# Patient Record
Sex: MALE | Race: WHITE | Employment: FULL TIME | URBAN - NONMETROPOLITAN AREA
[De-identification: names, ages, dates, MRNs, and addresses within clinical notes are randomized per-mention and may not be internally consistent; named-entity substitution may affect disease eponyms.]

---

## 2017-03-06 LAB
ABSOLUTE EOS #: 0.3 K/UL (ref 0–0.4)
ABSOLUTE LYMPH #: 1.8 K/UL (ref 1–4.8)
ABSOLUTE MONO #: 0.8 K/UL (ref 0.1–1.2)
ANION GAP SERPL CALCULATED.3IONS-SCNC: 12 MMOL/L (ref 9–17)
BASOPHILS # BLD: 1 % (ref 0–2)
BASOPHILS ABSOLUTE: 0.1 K/UL (ref 0–0.2)
BUN BLDV-MCNC: 12 MG/DL (ref 6–20)
BUN/CREAT BLD: 15 (ref 9–20)
CALCIUM SERPL-MCNC: 9.3 MG/DL (ref 8.6–10.4)
CHLORIDE BLD-SCNC: 101 MMOL/L (ref 98–107)
CHOLESTEROL/HDL RATIO: 3.7
CHOLESTEROL: 132 MG/DL
CO2: 27 MMOL/L (ref 20–31)
CREAT SERPL-MCNC: 0.8 MG/DL (ref 0.7–1.2)
DIFFERENTIAL TYPE: ABNORMAL
EOSINOPHILS RELATIVE PERCENT: 4 % (ref 1–4)
GFR AFRICAN AMERICAN: >60 ML/MIN
GFR NON-AFRICAN AMERICAN: >60 ML/MIN
GFR SERPL CREATININE-BSD FRML MDRD: NORMAL ML/MIN/{1.73_M2}
GFR SERPL CREATININE-BSD FRML MDRD: NORMAL ML/MIN/{1.73_M2}
GLUCOSE BLD-MCNC: 98 MG/DL (ref 70–99)
HCT VFR BLD CALC: 44 % (ref 41–53)
HDLC SERPL-MCNC: 36 MG/DL
HEMOGLOBIN: 14.7 G/DL (ref 13.5–17.5)
LDL CHOLESTEROL: 69 MG/DL (ref 0–130)
LYMPHOCYTES # BLD: 26 % (ref 24–44)
MCH RBC QN AUTO: 30.4 PG (ref 26–34)
MCHC RBC AUTO-ENTMCNC: 33.3 G/DL (ref 31–37)
MCV RBC AUTO: 91.2 FL (ref 80–100)
MONOCYTES # BLD: 12 % (ref 1–7)
PDW BLD-RTO: 12.9 % (ref 11–14.5)
PLATELET # BLD: 288 K/UL (ref 140–450)
PLATELET ESTIMATE: ABNORMAL
PMV BLD AUTO: 7.5 FL (ref 6–12)
POTASSIUM SERPL-SCNC: 4.3 MMOL/L (ref 3.7–5.3)
RBC # BLD: 4.82 M/UL (ref 4.5–5.9)
RBC # BLD: ABNORMAL 10*6/UL
SEG NEUTROPHILS: 57 % (ref 36–66)
SEGMENTED NEUTROPHILS ABSOLUTE COUNT: 4.1 K/UL (ref 1.8–7.7)
SEX HORMONE BINDING GLOBULIN: 23 NMOL/L (ref 11–80)
SODIUM BLD-SCNC: 140 MMOL/L (ref 135–144)
TESTOSTERONE FREE-NONMALE: 56 PG/ML (ref 47–244)
TESTOSTERONE TOTAL: 239 NG/DL (ref 220–1000)
TRIGL SERPL-MCNC: 135 MG/DL
TSH SERPL DL<=0.05 MIU/L-ACNC: 1.23 MIU/L (ref 0.3–5)
VLDLC SERPL CALC-MCNC: 27 MG/DL (ref 1–30)
WBC # BLD: 7.1 K/UL (ref 3.5–11)
WBC # BLD: ABNORMAL 10*3/UL

## 2017-12-07 ENCOUNTER — HOSPITAL ENCOUNTER (OUTPATIENT)
Dept: CT IMAGING | Age: 42
Discharge: HOME | End: 2017-12-07
Payer: COMMERCIAL

## 2017-12-07 DIAGNOSIS — M53.3 COCCYX PAIN: ICD-10-CM

## 2017-12-07 PROCEDURE — 72192 CT PELVIS W/O DYE: CPT

## 2018-10-04 ENCOUNTER — HOSPITAL ENCOUNTER (OUTPATIENT)
Dept: LAB | Age: 43
Discharge: HOME | End: 2018-10-04
Payer: COMMERCIAL

## 2018-10-04 ENCOUNTER — OFFICE VISIT (OUTPATIENT)
Dept: FAMILY MEDICINE CLINIC | Age: 43
End: 2018-10-04
Payer: COMMERCIAL

## 2018-10-04 VITALS
BODY MASS INDEX: 25.77 KG/M2 | TEMPERATURE: 98.2 F | DIASTOLIC BLOOD PRESSURE: 86 MMHG | OXYGEN SATURATION: 97 % | HEIGHT: 74 IN | HEART RATE: 83 BPM | SYSTOLIC BLOOD PRESSURE: 138 MMHG | WEIGHT: 200.8 LBS

## 2018-10-04 DIAGNOSIS — E55.9 VITAMIN D DEFICIENCY: ICD-10-CM

## 2018-10-04 DIAGNOSIS — Z11.4 SCREENING FOR HIV (HUMAN IMMUNODEFICIENCY VIRUS): ICD-10-CM

## 2018-10-04 DIAGNOSIS — Z13.1 SCREENING FOR DIABETES MELLITUS: ICD-10-CM

## 2018-10-04 DIAGNOSIS — Z13.220 SCREENING CHOLESTEROL LEVEL: ICD-10-CM

## 2018-10-04 DIAGNOSIS — R53.82 CHRONIC FATIGUE: ICD-10-CM

## 2018-10-04 DIAGNOSIS — Z00.00 VISIT FOR WELL MAN HEALTH CHECK: Primary | ICD-10-CM

## 2018-10-04 PROBLEM — R53.83 FATIGUE: Status: ACTIVE | Noted: 2017-03-17

## 2018-10-04 PROBLEM — G47.9 SLEEP DISTURBANCE: Status: ACTIVE | Noted: 2017-03-17

## 2018-10-04 LAB
ABSOLUTE EOS #: 0.2 K/UL (ref 0–0.4)
ABSOLUTE IMMATURE GRANULOCYTE: NORMAL K/UL (ref 0–0.3)
ABSOLUTE LYMPH #: 2.5 K/UL (ref 1–4.8)
ABSOLUTE MONO #: 0.6 K/UL (ref 0.1–1.2)
ANION GAP SERPL CALCULATED.3IONS-SCNC: 11 MMOL/L (ref 9–17)
BASOPHILS # BLD: 2 % (ref 0–2)
BASOPHILS ABSOLUTE: 0.1 K/UL (ref 0–0.2)
BUN BLDV-MCNC: 14 MG/DL (ref 6–20)
BUN/CREAT BLD: 16 (ref 9–20)
CALCIUM SERPL-MCNC: 9.5 MG/DL (ref 8.6–10.4)
CHLORIDE BLD-SCNC: 103 MMOL/L (ref 98–107)
CHOLESTEROL/HDL RATIO: 4.2
CHOLESTEROL: 161 MG/DL
CO2: 26 MMOL/L (ref 20–31)
CREAT SERPL-MCNC: 0.88 MG/DL (ref 0.7–1.2)
DIFFERENTIAL TYPE: NORMAL
EOSINOPHILS RELATIVE PERCENT: 3 % (ref 1–8)
ESTIMATED AVERAGE GLUCOSE: 105 MG/DL
GFR AFRICAN AMERICAN: >60 ML/MIN
GFR NON-AFRICAN AMERICAN: >60 ML/MIN
GFR SERPL CREATININE-BSD FRML MDRD: ABNORMAL ML/MIN/{1.73_M2}
GFR SERPL CREATININE-BSD FRML MDRD: ABNORMAL ML/MIN/{1.73_M2}
GLUCOSE BLD-MCNC: 107 MG/DL (ref 70–99)
HBA1C MFR BLD: 5.3 % (ref 4.8–5.9)
HCT VFR BLD CALC: 42.9 % (ref 41–53)
HDLC SERPL-MCNC: 38 MG/DL
HEMOGLOBIN: 14.6 G/DL (ref 13.5–17.5)
IMMATURE GRANULOCYTES: NORMAL %
LDL CHOLESTEROL: 83 MG/DL (ref 0–130)
LYMPHOCYTES # BLD: 34 % (ref 15–43)
MCH RBC QN AUTO: 32.3 PG (ref 26–34)
MCHC RBC AUTO-ENTMCNC: 34.1 G/DL (ref 31–37)
MCV RBC AUTO: 94.8 FL (ref 80–100)
MONOCYTES # BLD: 9 % (ref 6–14)
NRBC AUTOMATED: NORMAL PER 100 WBC
PDW BLD-RTO: 12.5 % (ref 11–14.5)
PLATELET # BLD: 299 K/UL (ref 140–450)
PLATELET ESTIMATE: NORMAL
PMV BLD AUTO: 8.2 FL (ref 6–12)
POTASSIUM SERPL-SCNC: 4.1 MMOL/L (ref 3.7–5.3)
RBC # BLD: 4.52 M/UL (ref 4.5–5.9)
RBC # BLD: NORMAL 10*6/UL
SEG NEUTROPHILS: 52 % (ref 44–74)
SEGMENTED NEUTROPHILS ABSOLUTE COUNT: 3.9 K/UL (ref 1.8–7.7)
SODIUM BLD-SCNC: 140 MMOL/L (ref 135–144)
TRIGL SERPL-MCNC: 202 MG/DL
TSH SERPL DL<=0.05 MIU/L-ACNC: 1.03 MIU/L (ref 0.3–5)
VLDLC SERPL CALC-MCNC: ABNORMAL MG/DL (ref 1–30)
WBC # BLD: 7.4 K/UL (ref 3.5–11)
WBC # BLD: NORMAL 10*3/UL

## 2018-10-04 PROCEDURE — 80061 LIPID PANEL: CPT

## 2018-10-04 PROCEDURE — 36415 COLL VENOUS BLD VENIPUNCTURE: CPT

## 2018-10-04 PROCEDURE — 84443 ASSAY THYROID STIM HORMONE: CPT

## 2018-10-04 PROCEDURE — 84270 ASSAY OF SEX HORMONE GLOBUL: CPT

## 2018-10-04 PROCEDURE — 80048 BASIC METABOLIC PNL TOTAL CA: CPT

## 2018-10-04 PROCEDURE — 85025 COMPLETE CBC W/AUTO DIFF WBC: CPT

## 2018-10-04 PROCEDURE — 83036 HEMOGLOBIN GLYCOSYLATED A1C: CPT

## 2018-10-04 PROCEDURE — 82306 VITAMIN D 25 HYDROXY: CPT

## 2018-10-04 PROCEDURE — 84403 ASSAY OF TOTAL TESTOSTERONE: CPT

## 2018-10-04 PROCEDURE — 99396 PREV VISIT EST AGE 40-64: CPT | Performed by: FAMILY MEDICINE

## 2018-10-04 PROCEDURE — 87389 HIV-1 AG W/HIV-1&-2 AB AG IA: CPT

## 2018-10-04 PROCEDURE — G8484 FLU IMMUNIZE NO ADMIN: HCPCS | Performed by: FAMILY MEDICINE

## 2018-10-04 RX ORDER — FLUTICASONE PROPIONATE 50 MCG
1 SPRAY, SUSPENSION (ML) NASAL DAILY
COMMUNITY
End: 2019-03-27

## 2018-10-04 RX ORDER — CALCIUM CARBONATE 200(500)MG
1 TABLET,CHEWABLE ORAL DAILY
COMMUNITY

## 2018-10-04 ASSESSMENT — ENCOUNTER SYMPTOMS
ABDOMINAL PAIN: 0
CHEST TIGHTNESS: 0
SHORTNESS OF BREATH: 0
CONSTIPATION: 0
DIARRHEA: 0
COUGH: 0
NAUSEA: 0
WHEEZING: 0

## 2018-10-04 ASSESSMENT — PATIENT HEALTH QUESTIONNAIRE - PHQ9
SUM OF ALL RESPONSES TO PHQ QUESTIONS 1-9: 0
1. LITTLE INTEREST OR PLEASURE IN DOING THINGS: 0
2. FEELING DOWN, DEPRESSED OR HOPELESS: 0
SUM OF ALL RESPONSES TO PHQ QUESTIONS 1-9: 0
SUM OF ALL RESPONSES TO PHQ9 QUESTIONS 1 & 2: 0

## 2018-10-04 NOTE — PROGRESS NOTES
[address] [facility]  Dept: [phone]  Dept Fax: [phone]  Loc: [phone]    Yana Hudson is a 44 y.o. male who presents today for his medical conditions/complaints as noted below. Yana Hudson is c/o of   Chief Complaint   Patient presents with   Vanesa Verdugo Doctor    Fatigue     x 1 year       HPI:     HPI Here today to establish care. He was previously seeing Dr. Maliha Hernandez. He is due for his well visit and he has been very tired. Fatigue: worsening; he has been having issues with feeling very tired and exhausted for the past 2 years. He had this evaluated by his previous pcp with blood work and they didn't seem to find something. He tried taking vit D and it didn't seem to help. He does not sleep well because he has a 1year old in his bed. He does not nap during the day. He gets up at 5am and goes to bed around 10:30pm. He is not having any issues with feeling depressed. He feels like his muscles get really fatigued easily. 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 He has issues with hip pain and his hips feeling out of place. Diet: He eats a balanced diet. He eats most foods. He is trying to eat healthier breakfast. He has been trying to drink less pop. Exercise: he does not get any regular exercise because he is frequently tired after work. He takes his omeprazole prn when he has stomach pain. When he was taking omeprazole he ended up with some broken bones.        Past Medical History:   Diagnosis Date    Allergic rhinitis     Dermatitis     scalp    Headache     Osteoarthritis     stomach ulcer           Social History   Substance Use Topics    Smoking status: Never Smoker    Smokeless tobacco: Never Used    Alcohol use No      Current Outpatient Prescriptions   Medication Sig Dispense Refill    calcium carbonate (TUMS) 500 MG chewable tablet Take 1 tablet by mouth daily      fluticasone

## 2018-10-05 LAB
HIV AG/AB: NONREACTIVE
SEX HORMONE BINDING GLOBULIN: 22 NMOL/L (ref 11–80)
TESTOSTERONE FREE-NONMALE: 43.2 PG/ML (ref 47–244)
TESTOSTERONE TOTAL: 184 NG/DL (ref 220–1000)
VITAMIN D 25-HYDROXY: 28.2 NG/ML (ref 30–100)

## 2018-10-08 DIAGNOSIS — R89.9 ABNORMAL LABORATORY TEST RESULT: Primary | ICD-10-CM

## 2018-10-26 ENCOUNTER — E-VISIT (OUTPATIENT)
Dept: FAMILY MEDICINE CLINIC | Age: 43
End: 2018-10-26
Payer: COMMERCIAL

## 2018-10-26 DIAGNOSIS — J01.90 ACUTE BACTERIAL SINUSITIS: Primary | ICD-10-CM

## 2018-10-26 DIAGNOSIS — J40 BRONCHITIS: ICD-10-CM

## 2018-10-26 DIAGNOSIS — B96.89 ACUTE BACTERIAL SINUSITIS: Primary | ICD-10-CM

## 2018-10-26 PROCEDURE — 99444 PR PHYSICIAN ONLINE EVALUATION & MANAGEMENT SERVICE: CPT | Performed by: FAMILY MEDICINE

## 2018-10-26 RX ORDER — ALBUTEROL SULFATE 90 UG/1
2 AEROSOL, METERED RESPIRATORY (INHALATION) EVERY 4 HOURS PRN
Qty: 1 INHALER | Refills: 0 | Status: SHIPPED | OUTPATIENT
Start: 2018-10-26 | End: 2019-03-28

## 2018-10-26 RX ORDER — DOXYCYCLINE HYCLATE 100 MG/1
100 CAPSULE ORAL 2 TIMES DAILY
Qty: 20 CAPSULE | Refills: 0 | Status: SHIPPED | OUTPATIENT
Start: 2018-10-26 | End: 2018-11-05

## 2018-10-26 ASSESSMENT — LIFESTYLE VARIABLES: SMOKING_STATUS: NO

## 2018-11-05 ENCOUNTER — OFFICE VISIT (OUTPATIENT)
Dept: UROLOGY | Age: 43
End: 2018-11-05
Payer: COMMERCIAL

## 2018-11-05 VITALS
SYSTOLIC BLOOD PRESSURE: 118 MMHG | DIASTOLIC BLOOD PRESSURE: 86 MMHG | WEIGHT: 201.6 LBS | HEIGHT: 74 IN | OXYGEN SATURATION: 99 % | HEART RATE: 77 BPM | BODY MASS INDEX: 25.87 KG/M2

## 2018-11-05 DIAGNOSIS — E29.1 HYPOGONADISM IN MALE: Primary | ICD-10-CM

## 2018-11-05 DIAGNOSIS — N52.9 ERECTILE DYSFUNCTION, UNSPECIFIED ERECTILE DYSFUNCTION TYPE: ICD-10-CM

## 2018-11-05 PROCEDURE — 99205 OFFICE O/P NEW HI 60 MIN: CPT | Performed by: UROLOGY

## 2018-11-05 NOTE — PROGRESS NOTES
Juliocesar Collazo MD        [facility]  [address] [facility]  Dept: [phone]  Dept Fax: [phone]: [phone]      [facility] Office Note - New Patient    Patient:  Salome Cr  YOB: 1975  Date: 11/6/2018    The patient is a 44 y.o. male who presents today for evaluation of the following problems:   Chief Complaint   Patient presents with    Other     low testosterone    referred/consultation requested by Megan Dumont MD.    HISTORY OF PRESENT ILLNESS:     Onset was  Months ago  Overall, the problem(s) are worse. Severity is described as moderate. Associated Symptoms: No dysuria, no gross hematuria. Current Pain Severity: 0      Hypogonadism:  Patient is here today for symptoms of low testosterone which started  year(s) ago. The patient's last testosterone level was:  Lab Results   Component Value Date    TESTOSTERONE 184 (L) 10/04/2018     Recently his hypogonadism symptoms: are worsening  Current medical Rx for hypogonadism: none  Previous treatments tried for ED or hypogonadism: none  His erections are partially present. Difficulting maintaining erection? Yes. Penile curvature? No  Premature Ejaculation? No  Ejaculation volume: normal  Sex drive/libido: decreased  Energy level:  decreased      Requested/reviewed records from Megan Dumont MD office and/or outside physician/[facility]    (Patient's old records have been requested, reviewed and pertinent findings summarized in today's note.)    Procedures Today: N/A      Last several PSA's:  Lab Results   Component Value Date    PSA 0.74 11/06/2018       Last total testosterone:  Lab Results   Component Value Date    TESTOSTERONE 184 (L) 10/04/2018       Urinalysis today:  No results found for this visit on 11/05/18.     Last BUN and creatinine:  Lab Results   Component Value Date    BUN 12 11/06/2018     Lab Results   Component Value Date    CREATININE 0.77 2018       Additional Lab/Culture results: none    Imaging Reviewed during this Office Visit:   Truman Adan MD independently reviewed the images and verified the radiology reports from:    CT scan 2017    1.  No acute osseous abnormality in the pelvis.  Specifically, no acute or   suspicious osseous abnormality is identified in the sacrum or coccyx.       2.  No acute process seen in the pelvis.       3.  Minimal degenerative changes at the hips and mild degenerative changes at   the lower lumbar spine.       4.  Mild sigmoid diverticulosis.             PAST MEDICAL, FAMILY AND SOCIAL HISTORY:  Past Medical History:   Diagnosis Date    Allergic rhinitis     Dermatitis     scalp    Headache     Osteoarthritis     stomach ulcer      Past Surgical History:   Procedure Laterality Date    CHOLECYSTECTOMY  14    CHOLECYSTECTOMY  2014    COSMETIC SURGERY      nose reset    ENDOSCOPY, COLON, DIAGNOSTIC       Family History   Problem Relation Age of Onset    Cancer Mother         breast    Diabetes Father     Psoriasis Sister     Other Sister         brain tumor     Outpatient Prescriptions Marked as Taking for the 18 encounter (Office Visit) with Sg Cheng MD   Medication Sig Dispense Refill    [] doxycycline hyclate (VIBRAMYCIN) 100 MG capsule Take 1 capsule by mouth 2 times daily for 10 days With food. 20 capsule 0    albuterol sulfate HFA (PROAIR HFA) 108 (90 Base) MCG/ACT inhaler Inhale 2 puffs into the lungs every 4 hours as needed for Wheezing or Shortness of Breath 1 Inhaler 0    calcium carbonate (TUMS) 500 MG chewable tablet Take 1 tablet by mouth daily      fluticasone (FLONASE) 50 MCG/ACT nasal spray 1 spray by Each Nare route daily      omeprazole (PRILOSEC) 40 MG delayed release capsule Take 40 mg by mouth daily      acetaminophen (TYLENOL) 500 MG tablet Take 500 mg by mouth every 6 hours as needed for Pain.          Augmentin [amoxicillin-pot clavulanate] and Number of Occurrences:   1     Standing Expiration Date:   11/5/2019    Comprehensive Metabolic Panel     Standing Status:   Future     Number of Occurrences:   1     Standing Expiration Date:   11/5/2019            Chencho Bagley MD

## 2018-11-06 ENCOUNTER — HOSPITAL ENCOUNTER (OUTPATIENT)
Dept: LAB | Age: 43
Discharge: HOME | End: 2018-11-06
Payer: COMMERCIAL

## 2018-11-06 DIAGNOSIS — E29.1 HYPOGONADISM IN MALE: ICD-10-CM

## 2018-11-06 LAB
ALBUMIN SERPL-MCNC: 4.5 G/DL (ref 3.5–5.2)
ALBUMIN/GLOBULIN RATIO: 1.4 (ref 1–2.5)
ALP BLD-CCNC: 64 U/L (ref 40–129)
ALT SERPL-CCNC: 30 U/L (ref 5–41)
ANION GAP SERPL CALCULATED.3IONS-SCNC: 13 MMOL/L (ref 9–17)
AST SERPL-CCNC: 21 U/L
BILIRUB SERPL-MCNC: 1.47 MG/DL (ref 0.3–1.2)
BUN BLDV-MCNC: 12 MG/DL (ref 6–20)
BUN/CREAT BLD: 16 (ref 9–20)
CALCIUM SERPL-MCNC: 9.3 MG/DL (ref 8.6–10.4)
CHLORIDE BLD-SCNC: 104 MMOL/L (ref 98–107)
CO2: 25 MMOL/L (ref 20–31)
CREAT SERPL-MCNC: 0.77 MG/DL (ref 0.7–1.2)
GFR AFRICAN AMERICAN: >60 ML/MIN
GFR NON-AFRICAN AMERICAN: >60 ML/MIN
GFR SERPL CREATININE-BSD FRML MDRD: ABNORMAL ML/MIN/{1.73_M2}
GFR SERPL CREATININE-BSD FRML MDRD: ABNORMAL ML/MIN/{1.73_M2}
GLUCOSE BLD-MCNC: 106 MG/DL (ref 70–99)
HCT VFR BLD CALC: 42.5 % (ref 41–53)
HEMOGLOBIN: 14.6 G/DL (ref 13.5–17.5)
MCH RBC QN AUTO: 32.1 PG (ref 26–34)
MCHC RBC AUTO-ENTMCNC: 34.4 G/DL (ref 31–37)
MCV RBC AUTO: 93.2 FL (ref 80–100)
NRBC AUTOMATED: NORMAL PER 100 WBC
PDW BLD-RTO: 12.5 % (ref 11–14.5)
PLATELET # BLD: 286 K/UL (ref 140–450)
PMV BLD AUTO: 8.1 FL (ref 6–12)
POTASSIUM SERPL-SCNC: 4.1 MMOL/L (ref 3.7–5.3)
PROSTATE SPECIFIC ANTIGEN: 0.74 UG/L
RBC # BLD: 4.56 M/UL (ref 4.5–5.9)
SODIUM BLD-SCNC: 142 MMOL/L (ref 135–144)
TESTOSTERONE TOTAL: 278 NG/DL (ref 220–1000)
TOTAL PROTEIN: 7.7 G/DL (ref 6.4–8.3)
WBC # BLD: 9.4 K/UL (ref 3.5–11)

## 2018-11-06 PROCEDURE — 80053 COMPREHEN METABOLIC PANEL: CPT

## 2018-11-06 PROCEDURE — 85027 COMPLETE CBC AUTOMATED: CPT

## 2018-11-06 PROCEDURE — 84403 ASSAY OF TOTAL TESTOSTERONE: CPT

## 2018-11-06 PROCEDURE — 84153 ASSAY OF PSA TOTAL: CPT

## 2018-11-06 PROCEDURE — 36415 COLL VENOUS BLD VENIPUNCTURE: CPT

## 2018-11-26 DIAGNOSIS — E29.1 HYPOGONADISM IN MALE: Primary | ICD-10-CM

## 2018-12-13 ENCOUNTER — HOSPITAL ENCOUNTER (OUTPATIENT)
Dept: LAB | Age: 43
Discharge: HOME | End: 2018-12-13
Payer: COMMERCIAL

## 2018-12-13 DIAGNOSIS — E29.1 HYPOGONADISM IN MALE: ICD-10-CM

## 2018-12-13 LAB — TESTOSTERONE TOTAL: 343 NG/DL (ref 220–1000)

## 2018-12-13 PROCEDURE — 36415 COLL VENOUS BLD VENIPUNCTURE: CPT

## 2018-12-13 PROCEDURE — 84403 ASSAY OF TOTAL TESTOSTERONE: CPT

## 2019-03-11 ENCOUNTER — OFFICE VISIT (OUTPATIENT)
Dept: OTOLARYNGOLOGY | Age: 44
End: 2019-03-11
Payer: COMMERCIAL

## 2019-03-11 VITALS
HEART RATE: 72 BPM | RESPIRATION RATE: 14 BRPM | SYSTOLIC BLOOD PRESSURE: 122 MMHG | WEIGHT: 201 LBS | BODY MASS INDEX: 25.8 KG/M2 | HEIGHT: 74 IN | DIASTOLIC BLOOD PRESSURE: 82 MMHG

## 2019-03-11 DIAGNOSIS — K14.8 TONGUE LESION: Primary | ICD-10-CM

## 2019-03-11 PROCEDURE — 99203 OFFICE O/P NEW LOW 30 MIN: CPT | Performed by: OTOLARYNGOLOGY

## 2019-03-23 ENCOUNTER — E-VISIT (OUTPATIENT)
Dept: FAMILY MEDICINE CLINIC | Age: 44
End: 2019-03-23
Payer: COMMERCIAL

## 2019-03-23 DIAGNOSIS — J06.9 URI WITH COUGH AND CONGESTION: Primary | ICD-10-CM

## 2019-03-23 PROCEDURE — 98969 PR NONPHYSICIAN ONLINE ASSESSMENT AND MANAGEMENT: CPT | Performed by: NURSE PRACTITIONER

## 2019-03-23 RX ORDER — AZITHROMYCIN 250 MG/1
TABLET, FILM COATED ORAL
Qty: 6 TABLET | Refills: 0 | Status: SHIPPED | OUTPATIENT
Start: 2019-03-23 | End: 2019-03-28 | Stop reason: ALTCHOICE

## 2019-03-23 RX ORDER — BENZONATATE 100 MG/1
200 CAPSULE ORAL 3 TIMES DAILY PRN
Qty: 20 CAPSULE | Refills: 0 | Status: SHIPPED | OUTPATIENT
Start: 2019-03-23 | End: 2019-03-28

## 2019-03-23 ASSESSMENT — LIFESTYLE VARIABLES: SMOKING_STATUS: NO, I'VE NEVER SMOKED

## 2019-03-27 ENCOUNTER — E-VISIT (OUTPATIENT)
Dept: FAMILY MEDICINE CLINIC | Age: 44
End: 2019-03-27
Payer: COMMERCIAL

## 2019-03-27 ENCOUNTER — PATIENT MESSAGE (OUTPATIENT)
Dept: FAMILY MEDICINE CLINIC | Age: 44
End: 2019-03-27

## 2019-03-27 DIAGNOSIS — R09.81 NASAL CONGESTION: Primary | ICD-10-CM

## 2019-03-27 DIAGNOSIS — J34.89 SINUS PRESSURE: ICD-10-CM

## 2019-03-27 PROCEDURE — 99444 PR PHYSICIAN ONLINE EVALUATION & MANAGEMENT SERVICE: CPT | Performed by: FAMILY MEDICINE

## 2019-03-27 RX ORDER — FLUTICASONE PROPIONATE 50 MCG
2 SPRAY, SUSPENSION (ML) NASAL DAILY
Qty: 1 BOTTLE | Refills: 0 | Status: SHIPPED | OUTPATIENT
Start: 2019-03-27

## 2019-03-27 RX ORDER — GUAIFENESIN 600 MG/1
600 TABLET, EXTENDED RELEASE ORAL 2 TIMES DAILY
Qty: 14 TABLET | Refills: 0 | Status: SHIPPED | OUTPATIENT
Start: 2019-03-27 | End: 2019-03-28

## 2019-03-27 ASSESSMENT — LIFESTYLE VARIABLES: SMOKING_STATUS: NO, I'VE NEVER SMOKED

## 2019-03-28 ENCOUNTER — HOSPITAL ENCOUNTER (OUTPATIENT)
Dept: LAB | Age: 44
Discharge: HOME | End: 2019-03-28
Payer: COMMERCIAL

## 2019-03-28 ENCOUNTER — OFFICE VISIT (OUTPATIENT)
Dept: FAMILY MEDICINE CLINIC | Age: 44
End: 2019-03-28
Payer: COMMERCIAL

## 2019-03-28 VITALS
SYSTOLIC BLOOD PRESSURE: 120 MMHG | HEART RATE: 84 BPM | BODY MASS INDEX: 26.77 KG/M2 | HEIGHT: 74 IN | DIASTOLIC BLOOD PRESSURE: 80 MMHG | WEIGHT: 208.6 LBS

## 2019-03-28 DIAGNOSIS — R59.0 LYMPHADENOPATHY, INGUINAL: ICD-10-CM

## 2019-03-28 DIAGNOSIS — R59.0 LYMPHADENOPATHY, INGUINAL: Primary | ICD-10-CM

## 2019-03-28 LAB
ABSOLUTE EOS #: 0.6 K/UL (ref 0–0.4)
ABSOLUTE IMMATURE GRANULOCYTE: ABNORMAL K/UL (ref 0–0.3)
ABSOLUTE LYMPH #: 1.9 K/UL (ref 1–4.8)
ABSOLUTE MONO #: 0.7 K/UL (ref 0.1–1.2)
ALBUMIN SERPL-MCNC: 4.7 G/DL (ref 3.5–5.2)
ALBUMIN/GLOBULIN RATIO: 1.6 (ref 1–2.5)
ALP BLD-CCNC: 65 U/L (ref 40–129)
ALT SERPL-CCNC: 52 U/L (ref 5–41)
ANION GAP SERPL CALCULATED.3IONS-SCNC: 14 MMOL/L (ref 9–17)
AST SERPL-CCNC: 34 U/L
BASOPHILS # BLD: 0 % (ref 0–2)
BASOPHILS ABSOLUTE: 0 K/UL (ref 0–0.2)
BILIRUB SERPL-MCNC: 1.2 MG/DL (ref 0.3–1.2)
BUN BLDV-MCNC: 10 MG/DL (ref 6–20)
BUN/CREAT BLD: 13 (ref 9–20)
CALCIUM SERPL-MCNC: 9.4 MG/DL (ref 8.6–10.4)
CHLORIDE BLD-SCNC: 104 MMOL/L (ref 98–107)
CO2: 26 MMOL/L (ref 20–31)
CREAT SERPL-MCNC: 0.8 MG/DL (ref 0.7–1.2)
DIFFERENTIAL TYPE: ABNORMAL
EOSINOPHILS RELATIVE PERCENT: 9 % (ref 1–8)
GFR AFRICAN AMERICAN: >60 ML/MIN
GFR NON-AFRICAN AMERICAN: >60 ML/MIN
GFR SERPL CREATININE-BSD FRML MDRD: ABNORMAL ML/MIN/{1.73_M2}
GFR SERPL CREATININE-BSD FRML MDRD: ABNORMAL ML/MIN/{1.73_M2}
GLUCOSE BLD-MCNC: 109 MG/DL (ref 70–99)
HCT VFR BLD CALC: 43.1 % (ref 41–53)
HEMOGLOBIN: 14.7 G/DL (ref 13.5–17.5)
IMMATURE GRANULOCYTES: ABNORMAL %
LYMPHOCYTES # BLD: 27 % (ref 15–43)
MCH RBC QN AUTO: 32.1 PG (ref 26–34)
MCHC RBC AUTO-ENTMCNC: 34.2 G/DL (ref 31–37)
MCV RBC AUTO: 93.8 FL (ref 80–100)
MONOCYTES # BLD: 10 % (ref 6–14)
NRBC AUTOMATED: ABNORMAL PER 100 WBC
PDW BLD-RTO: 12.9 % (ref 11–14.5)
PLATELET # BLD: 303 K/UL (ref 140–450)
PLATELET ESTIMATE: ABNORMAL
PMV BLD AUTO: 8 FL (ref 6–12)
POTASSIUM SERPL-SCNC: 4.5 MMOL/L (ref 3.7–5.3)
RBC # BLD: 4.59 M/UL (ref 4.5–5.9)
RBC # BLD: ABNORMAL 10*6/UL
SEG NEUTROPHILS: 54 % (ref 44–74)
SEGMENTED NEUTROPHILS ABSOLUTE COUNT: 3.9 K/UL (ref 1.8–7.7)
SODIUM BLD-SCNC: 144 MMOL/L (ref 135–144)
TOTAL PROTEIN: 7.7 G/DL (ref 6.4–8.3)
WBC # BLD: 7.1 K/UL (ref 3.5–11)
WBC # BLD: ABNORMAL 10*3/UL

## 2019-03-28 PROCEDURE — 36415 COLL VENOUS BLD VENIPUNCTURE: CPT

## 2019-03-28 PROCEDURE — 99214 OFFICE O/P EST MOD 30 MIN: CPT | Performed by: FAMILY MEDICINE

## 2019-03-28 PROCEDURE — 80053 COMPREHEN METABOLIC PANEL: CPT

## 2019-03-28 PROCEDURE — 85025 COMPLETE CBC W/AUTO DIFF WBC: CPT

## 2019-03-28 RX ORDER — DOXYCYCLINE HYCLATE 100 MG
100 TABLET ORAL 2 TIMES DAILY
Qty: 20 TABLET | Refills: 0 | Status: SHIPPED | OUTPATIENT
Start: 2019-03-28 | End: 2019-05-07 | Stop reason: ALTCHOICE

## 2019-03-28 ASSESSMENT — ENCOUNTER SYMPTOMS
WHEEZING: 0
DIARRHEA: 0
ABDOMINAL PAIN: 0
SHORTNESS OF BREATH: 0
NAUSEA: 0
CHEST TIGHTNESS: 0

## 2019-03-28 ASSESSMENT — PATIENT HEALTH QUESTIONNAIRE - PHQ9
SUM OF ALL RESPONSES TO PHQ QUESTIONS 1-9: 0
2. FEELING DOWN, DEPRESSED OR HOPELESS: 0
1. LITTLE INTEREST OR PLEASURE IN DOING THINGS: 0
SUM OF ALL RESPONSES TO PHQ9 QUESTIONS 1 & 2: 0
SUM OF ALL RESPONSES TO PHQ QUESTIONS 1-9: 0

## 2019-03-29 ENCOUNTER — HOSPITAL ENCOUNTER (OUTPATIENT)
Dept: ULTRASOUND IMAGING | Age: 44
Discharge: HOME | End: 2019-03-31
Payer: COMMERCIAL

## 2019-03-29 DIAGNOSIS — R59.0 LYMPHADENOPATHY, INGUINAL: ICD-10-CM

## 2019-03-29 PROCEDURE — 76705 ECHO EXAM OF ABDOMEN: CPT

## 2019-04-17 ENCOUNTER — E-VISIT (OUTPATIENT)
Dept: FAMILY MEDICINE CLINIC | Age: 44
End: 2019-04-17
Payer: COMMERCIAL

## 2019-04-17 PROCEDURE — 99444 PR PHYSICIAN ONLINE EVALUATION & MANAGEMENT SERVICE: CPT | Performed by: FAMILY MEDICINE

## 2019-04-17 RX ORDER — SULFAMETHOXAZOLE AND TRIMETHOPRIM 800; 160 MG/1; MG/1
1 TABLET ORAL 2 TIMES DAILY
Qty: 20 TABLET | Refills: 0 | Status: SHIPPED | OUTPATIENT
Start: 2019-04-17 | End: 2019-05-07 | Stop reason: ALTCHOICE

## 2019-04-17 ASSESSMENT — LIFESTYLE VARIABLES: SMOKING_STATUS: NO, I'VE NEVER SMOKED

## 2019-05-07 ENCOUNTER — OFFICE VISIT (OUTPATIENT)
Dept: FAMILY MEDICINE CLINIC | Age: 44
End: 2019-05-07
Payer: COMMERCIAL

## 2019-05-07 VITALS
SYSTOLIC BLOOD PRESSURE: 120 MMHG | HEART RATE: 86 BPM | HEIGHT: 74 IN | TEMPERATURE: 98 F | DIASTOLIC BLOOD PRESSURE: 80 MMHG | OXYGEN SATURATION: 97 % | BODY MASS INDEX: 26.67 KG/M2 | WEIGHT: 207.8 LBS

## 2019-05-07 DIAGNOSIS — J01.90 ACUTE BACTERIAL SINUSITIS: Primary | ICD-10-CM

## 2019-05-07 DIAGNOSIS — B96.89 ACUTE BACTERIAL SINUSITIS: Primary | ICD-10-CM

## 2019-05-07 PROCEDURE — 99214 OFFICE O/P EST MOD 30 MIN: CPT | Performed by: FAMILY MEDICINE

## 2019-05-07 RX ORDER — SULFAMETHOXAZOLE AND TRIMETHOPRIM 800; 160 MG/1; MG/1
1 TABLET ORAL 2 TIMES DAILY
Qty: 20 TABLET | Refills: 0 | Status: SHIPPED | OUTPATIENT
Start: 2019-05-07 | End: 2019-06-04

## 2019-05-07 ASSESSMENT — ENCOUNTER SYMPTOMS
SHORTNESS OF BREATH: 0
DIARRHEA: 0
SINUS PRESSURE: 1
COUGH: 1
ABDOMINAL PAIN: 0
SORE THROAT: 0

## 2019-05-07 NOTE — LETTER
[facility] [address] [place] [address]  Phone: [phone]  Fax: [phone]    Dora Shone, MD        May 7, 2019     Patient: Zaira Menard   YOB: 1975   Date of Visit: 5/7/2019       To Whom It May Concern: It is my medical opinion that Beatriz Izaguirre may return to work on 5/8/19. He missed work on 5/7/19 due to illness. .    If you have any questions or concerns, please don't hesitate to call.     Sincerely,        Dora Shone, MD

## 2019-05-07 NOTE — PROGRESS NOTES
[address] [place] [facility]  Dept: [phone]  Dept Fax: [phone]  Loc: [phone]    Thao Tomas is a 44 y.o. male who presents today for his medical conditions/complaints as noted below. Thao Tomas is c/o of   Chief Complaint   Patient presents with    Sinusitis     sinus pressure, headache, earpain, runny and stuffy nose, cough, - some chest congestion. x over a month - has 3 antibiotics and still has not gone away       HPI:     Here today for sinusitis. Sinusitis   This is a new problem. The current episode started 1 to 4 weeks ago (4 weeks). The problem has been gradually worsening since onset. There has been no fever. His pain is at a severity of 5/10. The pain is moderate. Associated symptoms include congestion, coughing, diaphoresis, ear pain, headaches, sinus pressure and sneezing. Pertinent negatives include no chills, shortness of breath or sore throat. Past treatments include antibiotics (doxycycline, bactrim, flonase, claritin). The treatment provided mild relief. Past Medical History:   Diagnosis Date    Allergic rhinitis     Dermatitis     scalp    Headache     Osteoarthritis     stomach ulcer           Social History     Tobacco Use    Smoking status: Never Smoker    Smokeless tobacco: Never Used   Substance Use Topics    Alcohol use: No     Current Outpatient Medications   Medication Sig Dispense Refill    sulfamethoxazole-trimethoprim (BACTRIM DS) 800-160 MG per tablet Take 1 tablet by mouth 2 times daily 20 tablet 0    fluticasone (FLONASE) 50 MCG/ACT nasal spray 2 sprays by Nasal route daily 1 Bottle 0    Multiple Vitamins-Minerals (MULTIVITAMIN ADULT PO) daily.       calcium carbonate (TUMS) 500 MG chewable tablet Take 1 tablet by mouth daily      omeprazole (PRILOSEC) 40 MG delayed release capsule Take 40 mg by mouth daily      acetaminophen (TYLENOL) 500 MG tablet Take 500 mg by (1.88 m)   Wt 207 lb 12.8 oz (94.3 kg)   SpO2 97%   BMI 26.68 kg/m²     Assessment:       Diagnosis Orders   1. Acute bacterial sinusitis               Plan:        Sinusitis: New; I will treat with bactrim. he was also advised to use flonase, nasal saline and mucinex for his congestion. Return if symptoms worsen or fail to improve. Orders Placed This Encounter   Medications    sulfamethoxazole-trimethoprim (BACTRIM DS) 800-160 MG per tablet     Sig: Take 1 tablet by mouth 2 times daily     Dispense:  20 tablet     Refill:  0       Patientgiven educational materials - see patient instructions. Discussed use, benefit,and side effects of prescribed medications. All patient questions answered. Ptvoiced understanding. Reviewed health maintenance. Instructed to continue currentmedications, diet and exercise. Patient agreed with treatment plan. Follow up asdirected.      Electronically signed by Daryle Ok, MD on 5/7/2019 at 2:05 PM

## 2019-05-14 ENCOUNTER — OFFICE VISIT (OUTPATIENT)
Dept: OTOLARYNGOLOGY | Age: 44
End: 2019-05-14
Payer: COMMERCIAL

## 2019-05-14 VITALS
TEMPERATURE: 98.2 F | SYSTOLIC BLOOD PRESSURE: 132 MMHG | WEIGHT: 206.4 LBS | BODY MASS INDEX: 26.5 KG/M2 | DIASTOLIC BLOOD PRESSURE: 82 MMHG

## 2019-05-14 DIAGNOSIS — K14.8 TONGUE LESION: Primary | ICD-10-CM

## 2019-05-14 DIAGNOSIS — J32.9 SINUSITIS, UNSPECIFIED CHRONICITY, UNSPECIFIED LOCATION: ICD-10-CM

## 2019-05-14 PROCEDURE — 92511 NASOPHARYNGOSCOPY: CPT | Performed by: OTOLARYNGOLOGY

## 2019-05-14 NOTE — PROGRESS NOTES
Benson Luna  5/14/19    Chief Complaint   Patient presents with    Follow-up     for cyst under tongue    Other     sinus problems       HPI: Fu for 3mm cyst to R of frenulum, also co periorbital pressure, on bactrim, on flonase, had CT 8yrs ago, showed sinus disease     Past Med Hx:     Past Medical History:   Diagnosis Date    Allergic rhinitis     Dermatitis     scalp    Headache     Osteoarthritis     stomach ulcer         Past Surgical History:   Procedure Laterality Date    CHOLECYSTECTOMY  9-9-14    CHOLECYSTECTOMY  09/2014    COSMETIC SURGERY  2013    nose reset    ENDOSCOPY, COLON, DIAGNOSTIC         Family History:     Family History   Problem Relation Age of Onset    Cancer Mother         breast    Diabetes Father     Psoriasis Sister     Other Sister         brain tumor       Social Hx:     Social History     Socioeconomic History    Marital status:      Spouse name: Not on file    Number of children: Not on file    Years of education: Not on file    Highest education level: Not on file   Occupational History    Occupation: maintence   Social Needs    Financial resource strain: Not on file    Food insecurity:     Worry: Not on file     Inability: Not on file    Transportation needs:     Medical: Not on file     Non-medical: Not on file   Tobacco Use    Smoking status: Never Smoker    Smokeless tobacco: Never Used   Substance and Sexual Activity    Alcohol use: No    Drug use: No    Sexual activity: Yes     Partners: Female     Comment: wife   Lifestyle    Physical activity:     Days per week: Not on file     Minutes per session: Not on file    Stress: Not on file   Relationships    Social connections:     Talks on phone: Not on file     Gets together: Not on file     Attends Roman Catholic service: Not on file     Active member of club or organization: Not on file     Attends meetings of clubs or organizations: Not on file     Relationship status: Not on file   Ronny Garner Normal    Naso pharynx: Normal    R eusatchion tube orifice: Normal    L eustachion tube orifice: Normal    R OMC:neg    L OMC:neg    Other:       Oral Cavity & Oral Pharynx:    Tongue:  Normal    Teeth & Gums:             Palate:  Terra     Tonsils:      FOM:  Normal     Other:      Neck:    Thyroid:Normal       Lymph nodes: Normal           Trachea:  Normal      Masses:  Normal    Other:        Eyes:    EOMs:      Nystagmus:      Neurological:    CN V:      CN VII:       Gait & Station:      Romberg:      Tandem Gait:      [place]:       Oriented x 3: Normal     Affect:  Normal    Data reviewed:      ASSESSMENT:   Diagnosis Orders   1. Tongue lesion     2. Sinusitis, unspecified chronicity, unspecified location  OK NASOPHARYNGOSCOPY    CT Sinus WO Contrast       PLAN:Ct sinus, cont flonase  Return for follow -up, test results. No orders of the defined types were placed in this encounter.         Tucker Dennis MD

## 2019-05-31 ENCOUNTER — HOSPITAL ENCOUNTER (OUTPATIENT)
Dept: CT IMAGING | Age: 44
Discharge: HOME | End: 2019-06-02
Payer: COMMERCIAL

## 2019-05-31 DIAGNOSIS — J32.9 SINUSITIS, UNSPECIFIED CHRONICITY, UNSPECIFIED LOCATION: ICD-10-CM

## 2019-05-31 PROCEDURE — 70486 CT MAXILLOFACIAL W/O DYE: CPT

## 2019-06-04 ENCOUNTER — OFFICE VISIT (OUTPATIENT)
Dept: OTOLARYNGOLOGY | Age: 44
End: 2019-06-04
Payer: COMMERCIAL

## 2019-06-04 VITALS
BODY MASS INDEX: 28.04 KG/M2 | DIASTOLIC BLOOD PRESSURE: 82 MMHG | HEART RATE: 68 BPM | SYSTOLIC BLOOD PRESSURE: 138 MMHG | RESPIRATION RATE: 14 BRPM | HEIGHT: 72 IN | WEIGHT: 207 LBS

## 2019-06-04 DIAGNOSIS — J31.0 RHINITIS, UNSPECIFIED TYPE: ICD-10-CM

## 2019-06-04 DIAGNOSIS — K14.8 TONGUE LESION: Primary | ICD-10-CM

## 2019-06-04 PROCEDURE — 99213 OFFICE O/P EST LOW 20 MIN: CPT | Performed by: OTOLARYNGOLOGY

## 2019-06-04 NOTE — PROGRESS NOTES
Martha Patrickuriel  6/4/19    No chief complaint on file.       HPI: Fu for tongue lesion, periorbital pressure, CT sinus neg, on flonase    Past Med Hx:     Past Medical History:   Diagnosis Date    Allergic rhinitis     Dermatitis     scalp    Headache     Osteoarthritis     stomach ulcer         Past Surgical History:   Procedure Laterality Date    CHOLECYSTECTOMY  9-9-14    CHOLECYSTECTOMY  09/2014    COSMETIC SURGERY  2013    nose reset    ENDOSCOPY, COLON, DIAGNOSTIC         Family History:     Family History   Problem Relation Age of Onset   Thorne Sos Cancer Mother         breast    Diabetes Father     Psoriasis Sister     Other Sister         brain tumor       Social Hx:     Social History     Socioeconomic History    Marital status:      Spouse name: Not on file    Number of children: Not on file    Years of education: Not on file    Highest education level: Not on file   Occupational History    Occupation: maintence   Social Needs    Financial resource strain: Not on file    Food insecurity:     Worry: Not on file     Inability: Not on file    Transportation needs:     Medical: Not on file     Non-medical: Not on file   Tobacco Use    Smoking status: Never Smoker    Smokeless tobacco: Never Used   Substance and Sexual Activity    Alcohol use: No    Drug use: No    Sexual activity: Yes     Partners: Female     Comment: wife   Lifestyle    Physical activity:     Days per week: Not on file     Minutes per session: Not on file    Stress: Not on file   Relationships    Social connections:     Talks on phone: Not on file     Gets together: Not on file     Attends Roman Catholic service: Not on file     Active member of club or organization: Not on file     Attends meetings of clubs or organizations: Not on file     Relationship status: Not on file    Intimate partner violence:     Fear of current or ex partner: Not on file     Emotionally abused: Not on file     Physically abused: Not on file     Forced sexual activity: Not on file   Other Topics Concern    Not on file   Social History Narrative    Not on file       Current Medications:     Current Outpatient Medications:     sulfamethoxazole-trimethoprim (BACTRIM DS) 800-160 MG per tablet, Take 1 tablet by mouth 2 times daily, Disp: 20 tablet, Rfl: 0    fluticasone (FLONASE) 50 MCG/ACT nasal spray, 2 sprays by Nasal route daily, Disp: 1 Bottle, Rfl: 0    Multiple Vitamins-Minerals (MULTIVITAMIN ADULT PO), daily. , Disp: , Rfl:     calcium carbonate (TUMS) 500 MG chewable tablet, Take 1 tablet by mouth daily, Disp: , Rfl:     omeprazole (PRILOSEC) 40 MG delayed release capsule, Take 40 mg by mouth daily, Disp: , Rfl:     acetaminophen (TYLENOL) 500 MG tablet, Take 500 mg by mouth every 6 hours as needed for Pain., Disp: , Rfl:      ROS:   CV: neg   Endocrine:    Resp:     GI:       Neuro:   PE:     General appearance:  Normal                 Ability to Communicate:  Normal       Head & Face:  Normal   Salivary Glands:  Normal              Facial Strength:  Normal   Ears:    Pinna:  Normal            EAC:  Normal      TMs:  Normal       [place]:  Purcell Rinne     Finger Rub     Nose:    External: Normal    Septum:  Normal    Turbinates: Normal             Nasal Cavity: Normal         Naso Pharyngoscopy:     Nasal Endoscopy:      Oral Cavity & Oral Pharynx:    Tongue:  Normal    Teeth & Gums:             Palate:  Terra     Tonsils:      FOM:  2-3 mm cyst R of frenulum    Other:      Neck:    Thyroid:Normal       Lymph nodes: Normal           Trachea:  Normal      Masses:  Normal    Other:        Eyes:    EOMs:      Nystagmus:      Neurological:    CN V:      CN VII:       Gait & Station:      Romberg:      Tandem Gait:      Halpikes:       Oriented x 3: Normal     Affect:  Normal    Data reviewed:      ASSESSMENT:   Diagnosis Orders   1.  Tongue lesion         PLAN:cont flonase, add claritin D 12 h, see prn  No follow-ups on file.    No orders of the defined types were placed in this encounter.         Sandra Reyes MD

## 2019-10-07 ENCOUNTER — OFFICE VISIT (OUTPATIENT)
Dept: FAMILY MEDICINE CLINIC | Age: 44
End: 2019-10-07
Payer: COMMERCIAL

## 2019-10-07 ENCOUNTER — HOSPITAL ENCOUNTER (OUTPATIENT)
Dept: LAB | Age: 44
Discharge: HOME | End: 2019-10-07
Payer: COMMERCIAL

## 2019-10-07 VITALS
BODY MASS INDEX: 26.95 KG/M2 | DIASTOLIC BLOOD PRESSURE: 78 MMHG | HEIGHT: 72 IN | HEART RATE: 85 BPM | OXYGEN SATURATION: 99 % | SYSTOLIC BLOOD PRESSURE: 124 MMHG | WEIGHT: 199 LBS

## 2019-10-07 DIAGNOSIS — Z13.220 SCREENING FOR CHOLESTEROL LEVEL: ICD-10-CM

## 2019-10-07 DIAGNOSIS — Z13.1 SCREENING FOR DIABETES MELLITUS (DM): ICD-10-CM

## 2019-10-07 DIAGNOSIS — Z00.00 VISIT FOR WELL MAN HEALTH CHECK: Primary | ICD-10-CM

## 2019-10-07 DIAGNOSIS — L21.9 SEBORRHEIC DERMATITIS OF SCALP: ICD-10-CM

## 2019-10-07 PROBLEM — L30.9 ECZEMA: Chronic | Status: ACTIVE | Noted: 2019-10-07

## 2019-10-07 LAB
ANION GAP SERPL CALCULATED.3IONS-SCNC: 8 MMOL/L (ref 9–17)
BUN BLDV-MCNC: 11 MG/DL (ref 6–20)
BUN/CREAT BLD: 13 (ref 9–20)
CALCIUM SERPL-MCNC: 9.5 MG/DL (ref 8.6–10.4)
CHLORIDE BLD-SCNC: 106 MMOL/L (ref 98–107)
CHOLESTEROL/HDL RATIO: 3.9
CHOLESTEROL: 149 MG/DL
CO2: 27 MMOL/L (ref 20–31)
CREAT SERPL-MCNC: 0.86 MG/DL (ref 0.7–1.2)
ESTIMATED AVERAGE GLUCOSE: 108 MG/DL
GFR AFRICAN AMERICAN: >60 ML/MIN
GFR NON-AFRICAN AMERICAN: >60 ML/MIN
GFR SERPL CREATININE-BSD FRML MDRD: ABNORMAL ML/MIN/{1.73_M2}
GFR SERPL CREATININE-BSD FRML MDRD: ABNORMAL ML/MIN/{1.73_M2}
GLUCOSE BLD-MCNC: 109 MG/DL (ref 70–99)
HBA1C MFR BLD: 5.4 % (ref 4.8–5.9)
HDLC SERPL-MCNC: 38 MG/DL
LDL CHOLESTEROL: 85 MG/DL (ref 0–130)
POTASSIUM SERPL-SCNC: 4 MMOL/L (ref 3.7–5.3)
SODIUM BLD-SCNC: 141 MMOL/L (ref 135–144)
TRIGL SERPL-MCNC: 131 MG/DL
VLDLC SERPL CALC-MCNC: ABNORMAL MG/DL (ref 1–30)

## 2019-10-07 PROCEDURE — 99396 PREV VISIT EST AGE 40-64: CPT | Performed by: FAMILY MEDICINE

## 2019-10-07 PROCEDURE — 80048 BASIC METABOLIC PNL TOTAL CA: CPT

## 2019-10-07 PROCEDURE — 36415 COLL VENOUS BLD VENIPUNCTURE: CPT

## 2019-10-07 PROCEDURE — 80061 LIPID PANEL: CPT

## 2019-10-07 PROCEDURE — 83036 HEMOGLOBIN GLYCOSYLATED A1C: CPT

## 2019-10-07 RX ORDER — BETAMETHASONE VALERATE 1.2 MG/G
AEROSOL, FOAM TOPICAL PRN
Qty: 100 G | Refills: 5 | Status: SHIPPED | OUTPATIENT
Start: 2019-10-07

## 2019-10-07 RX ORDER — RANITIDINE HCL 75 MG
75 TABLET ORAL PRN
COMMUNITY
End: 2020-08-24 | Stop reason: ALTCHOICE

## 2019-10-07 ASSESSMENT — ENCOUNTER SYMPTOMS
ABDOMINAL PAIN: 0
WHEEZING: 0
CHEST TIGHTNESS: 0
COUGH: 0
BACK PAIN: 0
SHORTNESS OF BREATH: 0
DIARRHEA: 0
CONSTIPATION: 0

## 2020-08-13 ENCOUNTER — TELEPHONE (OUTPATIENT)
Dept: FAMILY MEDICINE CLINIC | Age: 45
End: 2020-08-13

## 2020-08-13 NOTE — TELEPHONE ENCOUNTER
Pt is calling and would like to become a new patient with [facility]. He does not have any family members that come there.   Please advise pt at [phone]

## 2020-08-24 ENCOUNTER — OFFICE VISIT (OUTPATIENT)
Dept: FAMILY MEDICINE CLINIC | Age: 45
End: 2020-08-24
Payer: COMMERCIAL

## 2020-08-24 VITALS
HEIGHT: 72 IN | SYSTOLIC BLOOD PRESSURE: 124 MMHG | BODY MASS INDEX: 26.22 KG/M2 | WEIGHT: 193.6 LBS | TEMPERATURE: 97.4 F | RESPIRATION RATE: 12 BRPM | DIASTOLIC BLOOD PRESSURE: 82 MMHG | HEART RATE: 108 BPM

## 2020-08-24 PROCEDURE — 36415 COLL VENOUS BLD VENIPUNCTURE: CPT | Performed by: NURSE PRACTITIONER

## 2020-08-24 PROCEDURE — 99204 OFFICE O/P NEW MOD 45 MIN: CPT | Performed by: NURSE PRACTITIONER

## 2020-08-24 RX ORDER — AZELASTINE 1 MG/ML
2 SPRAY, METERED NASAL 2 TIMES DAILY
Qty: 4 BOTTLE | Refills: 1 | Status: SHIPPED | OUTPATIENT
Start: 2020-08-24 | End: 2020-10-05 | Stop reason: ALTCHOICE

## 2020-08-24 RX ORDER — COVID-19 ANTIGEN TEST
1 KIT MISCELLANEOUS PRN
COMMUNITY

## 2020-08-24 RX ORDER — FAMOTIDINE 20 MG/1
20 TABLET, FILM COATED ORAL 2 TIMES DAILY
Qty: 180 TABLET | Refills: 1 | Status: SHIPPED | OUTPATIENT
Start: 2020-08-24 | End: 2020-10-06

## 2020-08-24 ASSESSMENT — ENCOUNTER SYMPTOMS
TROUBLE SWALLOWING: 1
GASTROINTESTINAL NEGATIVE: 1
RESPIRATORY NEGATIVE: 1
EYES NEGATIVE: 1

## 2020-08-24 ASSESSMENT — PATIENT HEALTH QUESTIONNAIRE - PHQ9
SUM OF ALL RESPONSES TO PHQ9 QUESTIONS 1 & 2: 0
1. LITTLE INTEREST OR PLEASURE IN DOING THINGS: 0
SUM OF ALL RESPONSES TO PHQ QUESTIONS 1-9: 0
2. FEELING DOWN, DEPRESSED OR HOPELESS: 0
SUM OF ALL RESPONSES TO PHQ QUESTIONS 1-9: 0

## 2020-08-24 NOTE — PROGRESS NOTES
Trevor Freitas is a 38 y.o. male whopresents today for :  Chief Complaint   Patient presents with    New Patient     Establish    Other     Trouble swallowing       HPI:     HPIpt here to establish. Reports polyarthralgia feels stiff all day. particulalry in hands and shoulders. Does have a history of dermatitis on his scalp. Complains of chronic indigestion. Has used ppi before but broke some ribs and stopped med. Tried to control reflux with diet.          Patient Active Problem List   Diagnosis    Sleep disturbance    Fatigue    Vitamin D deficiency    Eczema        Past Medical History:   Diagnosis Date    Allergic rhinitis     Broken nose     Broken ribs     Dermatitis     scalp    Headache     History of blood clots     Right leg    Osteoarthritis     stomach ulcer       Past Surgical History:   Procedure Laterality Date    CHOLECYSTECTOMY  9-9-14    CHOLECYSTECTOMY  09/2014    COSMETIC SURGERY  2013    nose reset    ENDOSCOPY, COLON, DIAGNOSTIC       Family History   Problem Relation Age of Onset    Cancer Mother         breast    Diabetes Father     Psoriasis Sister     Other Sister         brain tumor     Social History     Tobacco Use    Smoking status: Never Smoker    Smokeless tobacco: Never Used   Substance Use Topics    Alcohol use: Yes      Current Outpatient Medications   Medication Sig Dispense Refill    Naproxen Sodium (ALEVE) 220 MG CAPS Take 1 capsule by mouth as needed for Pain      Famotidine (PEPCID AC PO) Take 1 tablet by mouth as needed      famotidine (PEPCID) 20 MG tablet Take 1 tablet by mouth 2 times daily 180 tablet 1    azelastine (ASTELIN) 0.1 % nasal spray 2 sprays by Nasal route 2 times daily Use in each nostril as directed 4 Bottle 1    betamethasone valerate (LUXIQ) 0.12 % FOAM foam Apply topically as needed (eczema) Indications: for scalp 100 g 5    Loratadine-Pseudoephedrine (CLARITIN-D 12 HOUR PO) Take by mouth      fluticasone (FLONASE) 50 MCG/ACT nasal spray 2 sprays by Nasal route daily 1 Bottle 0    Multiple Vitamins-Minerals (MULTIVITAMIN ADULT PO) daily.  calcium carbonate (TUMS) 500 MG chewable tablet Take 1 tablet by mouth daily      acetaminophen (TYLENOL) 500 MG tablet Take 500 mg by mouth every 6 hours as needed for Pain. No current facility-administered medications for this visit. Allergies   Allergen Reactions    Augmentin [Amoxicillin-Pot Clavulanate]     Neosporin [Neomycin-Polymyxin-Gramicidin] Rash     Health Maintenance   Topic Date Due    DTaP/Tdap/Td vaccine (1 - Tdap) 10/01/1995    Flu vaccine (1) 09/01/2020    Diabetes screen  10/07/2022    Lipid screen  10/07/2024    HIV screen  Completed    Hepatitis A vaccine  Aged Out    Hepatitis B vaccine  Aged Out    Hib vaccine  Aged Out    Meningococcal (ACWY) vaccine  Aged Out    Pneumococcal 0-64 years Vaccine  Aged Out       Subjective:     Review of Systems   Constitutional: Negative. HENT: Positive for trouble swallowing. Eyes: Negative. Respiratory: Negative. Cardiovascular: Negative. Gastrointestinal: Negative. Musculoskeletal: Positive for arthralgias. Skin: Negative. Neurological: Negative. Objective:     Vitals:    08/24/20 1510   BP: 124/82   Site: Left Upper Arm   Position: Sitting   Cuff Size: Medium Adult   Pulse: 108   Resp: 12   Temp: 97.4 °F (36.3 °C)   TempSrc: Temporal   Weight: 193 lb 9.6 oz (87.8 kg)   Height: 6' 0.01\" (1.829 m)       Physical Exam  Constitutional:       Appearance: He is well-developed. HENT:      Head: Normocephalic. Right Ear: Tympanic membrane and external ear normal.      Left Ear: Tympanic membrane and external ear normal.      Nose: Nose normal.   Neck:      Musculoskeletal: Normal range of motion and neck supple. Cardiovascular:      Rate and Rhythm: Normal rate and regular rhythm. Heart sounds: Normal heart sounds. No murmur. No friction rub. No gallop.     Pulmonary: Effort: Pulmonary effort is normal.      Breath sounds: Normal breath sounds. No wheezing or rales. Abdominal:      General: Bowel sounds are normal.      Palpations: Abdomen is soft. Tenderness: There is no abdominal tenderness. There is no guarding. Musculoskeletal: Normal range of motion. Arms:    Lymphadenopathy:      Cervical: No cervical adenopathy. Skin:     General: Skin is warm. Neurological:      Mental Status: He is alert and oriented to person, place, and time. Deep Tendon Reflexes: Reflexes are normal and symmetric. Assessment:      Diagnosis Orders   1. Gastroesophageal reflux disease with esophagitis  famotidine (PEPCID) 20 MG tablet   2. Polyarthralgia  CBC Auto Differential    TSH    KIYA Screen With Reflex    Rheumatoid Factor    HLA Antigen B27    Sedimentation Rate    C-Reactive Protein    Comprehensive Metabolic Panel    Comprehensive Metabolic Panel    C-Reactive Protein    Sedimentation Rate    HLA Antigen B27    Rheumatoid Factor    KIYA Screen With Reflex    TSH    CBC Auto Differential   3. Psoriasis  CBC Auto Differential    TSH    KIYA Screen With Reflex    Rheumatoid Factor    HLA Antigen B27    Sedimentation Rate    C-Reactive Protein    Comprehensive Metabolic Panel    Comprehensive Metabolic Panel    C-Reactive Protein    Sedimentation Rate    HLA Antigen B27    Rheumatoid Factor    KIYA Screen With Reflex    TSH    CBC Auto Differential   4. Screening for prostate cancer  PSA Screening    PSA Screening   5. Chronic rhinitis  azelastine (ASTELIN) 0.1 % nasal spray       Plan:      Return in about 2 weeks (around 9/7/2020). Pt is on claritan d for his sinus. I recommend to stop that as likely making reflux worse. Will replace with astelin  Add pepcid for gerd  Labs to check for autoimmune arthritis. I suspect pt has psoriatic arthritis.         Orders Placed This Encounter   Procedures    CBC Auto Differential     Standing Status:   Future Number of Occurrences:   1     Standing Expiration Date:   2021    TSH     Standing Status:   Future     Number of Occurrences:   1     Standing Expiration Date:   2021    KIYA Screen With Reflex     Standing Status:   Future     Number of Occurrences:   1     Standing Expiration Date:   2021    Rheumatoid Factor     Standing Status:   Future     Number of Occurrences:   1     Standing Expiration Date:   2021    HLA Antigen B27     Standing Status:   Future     Number of Occurrences:   1     Standing Expiration Date:   2021    Sedimentation Rate     Standing Status:   Future     Number of Occurrences:   1     Standing Expiration Date:   2021    C-Reactive Protein     Standing Status:   Future     Number of Occurrences:   1     Standing Expiration Date:   2021    Comprehensive Metabolic Panel     Standing Status:   Future     Number of Occurrences:   1     Standing Expiration Date:   2021    PSA Screening     Standing Status:   Future     Number of Occurrences:   1     Standing Expiration Date:   2021     Orders Placed This Encounter   Medications    famotidine (PEPCID) 20 MG tablet     Sig: Take 1 tablet by mouth 2 times daily     Dispense:  180 tablet     Refill:  1    azelastine (ASTELIN) 0.1 % nasal spray     Si sprays by Nasal route 2 times daily Use in each nostril as directed     Dispense:  4 Bottle     Refill:  1          Patient given educational materials - seepatient instructions. Discussed use, benefit, and side effects of prescribed medications. All patient questions answered. Pt voiced understanding. Patient agreed withtreatment plan. Follow up as directed.      Electronically signed by AKHIL Ivory CNP on 2020 at 6:01 PM

## 2020-08-25 LAB
ALBUMIN SERPL-MCNC: 4.6 G/DL (ref 3.5–5.1)
ALP BLD-CCNC: 59 U/L (ref 38–126)
ALT SERPL-CCNC: 29 U/L (ref 11–66)
ANION GAP SERPL CALCULATED.3IONS-SCNC: 11 MEQ/L (ref 8–16)
AST SERPL-CCNC: 22 U/L (ref 5–40)
BASOPHILS # BLD: 1.3 %
BASOPHILS ABSOLUTE: 0.1 THOU/MM3 (ref 0–0.1)
BILIRUB SERPL-MCNC: 1.3 MG/DL (ref 0.3–1.2)
BUN BLDV-MCNC: 11 MG/DL (ref 7–22)
C-REACTIVE PROTEIN: 0.17 MG/DL (ref 0–1)
CALCIUM SERPL-MCNC: 9.9 MG/DL (ref 8.5–10.5)
CHLORIDE BLD-SCNC: 107 MEQ/L (ref 98–111)
CO2: 25 MEQ/L (ref 23–33)
CREAT SERPL-MCNC: 0.8 MG/DL (ref 0.4–1.2)
EOSINOPHIL # BLD: 3.3 %
EOSINOPHILS ABSOLUTE: 0.3 THOU/MM3 (ref 0–0.4)
ERYTHROCYTE [DISTWIDTH] IN BLOOD BY AUTOMATED COUNT: 12.2 % (ref 11.5–14.5)
ERYTHROCYTE [DISTWIDTH] IN BLOOD BY AUTOMATED COUNT: 42.3 FL (ref 35–45)
GFR SERPL CREATININE-BSD FRML MDRD: > 90 ML/MIN/1.73M2
GLUCOSE BLD-MCNC: 102 MG/DL (ref 70–108)
HCT VFR BLD CALC: 44.4 % (ref 42–52)
HEMOGLOBIN: 15 GM/DL (ref 14–18)
IMMATURE GRANS (ABS): 0.03 THOU/MM3 (ref 0–0.07)
IMMATURE GRANULOCYTES: 0.4 %
LYMPHOCYTES # BLD: 27.4 %
LYMPHOCYTES ABSOLUTE: 2.2 THOU/MM3 (ref 1–4.8)
MCH RBC QN AUTO: 32.6 PG (ref 26–33)
MCHC RBC AUTO-ENTMCNC: 33.8 GM/DL (ref 32.2–35.5)
MCV RBC AUTO: 96.5 FL (ref 80–94)
MONOCYTES # BLD: 8.9 %
MONOCYTES ABSOLUTE: 0.7 THOU/MM3 (ref 0.4–1.3)
NUCLEATED RED BLOOD CELLS: 0 /100 WBC
PLATELET # BLD: 296 THOU/MM3 (ref 130–400)
PMV BLD AUTO: 11.1 FL (ref 9.4–12.4)
POTASSIUM SERPL-SCNC: 4.3 MEQ/L (ref 3.5–5.2)
PROSTATE SPECIFIC ANTIGEN: 0.65 NG/ML (ref 0–1)
RBC # BLD: 4.6 MILL/MM3 (ref 4.7–6.1)
RHEUMATOID FACTOR: < 10 IU/ML (ref 0–13)
SEDIMENTATION RATE, ERYTHROCYTE: 5 MM/HR (ref 0–10)
SEG NEUTROPHILS: 58.7 %
SEGMENTED NEUTROPHILS ABSOLUTE COUNT: 4.7 THOU/MM3 (ref 1.8–7.7)
SODIUM BLD-SCNC: 143 MEQ/L (ref 135–145)
TOTAL PROTEIN: 6.9 G/DL (ref 6.1–8)
TSH SERPL DL<=0.05 MIU/L-ACNC: 1.24 UIU/ML (ref 0.4–4.2)
WBC # BLD: 8 THOU/MM3 (ref 4.8–10.8)

## 2020-08-27 ENCOUNTER — TELEPHONE (OUTPATIENT)
Dept: FAMILY MEDICINE CLINIC | Age: 45
End: 2020-08-27

## 2020-08-27 LAB
ANA SCREEN: DETECTED
HLA-B27: POSITIVE

## 2020-08-27 NOTE — TELEPHONE ENCOUNTER
Please call pt. His labs indicated he likely has rheumatoid arthritis.   I am going to refer to rheumatology

## 2020-09-08 ENCOUNTER — OFFICE VISIT (OUTPATIENT)
Dept: FAMILY MEDICINE CLINIC | Age: 45
End: 2020-09-08
Payer: COMMERCIAL

## 2020-09-08 VITALS
TEMPERATURE: 97.1 F | HEART RATE: 95 BPM | SYSTOLIC BLOOD PRESSURE: 122 MMHG | DIASTOLIC BLOOD PRESSURE: 84 MMHG | OXYGEN SATURATION: 98 % | WEIGHT: 197 LBS | HEIGHT: 72 IN | RESPIRATION RATE: 14 BRPM | BODY MASS INDEX: 26.68 KG/M2

## 2020-09-08 PROCEDURE — 99214 OFFICE O/P EST MOD 30 MIN: CPT | Performed by: NURSE PRACTITIONER

## 2020-09-08 ASSESSMENT — ENCOUNTER SYMPTOMS
TROUBLE SWALLOWING: 1
EYES NEGATIVE: 1
RESPIRATORY NEGATIVE: 1
VOICE CHANGE: 1
GASTROINTESTINAL NEGATIVE: 1

## 2020-09-08 NOTE — PROGRESS NOTES
Raymond Rios is a 38 y.o. male whopresents today for :  Chief Complaint   Patient presents with    2 Week Follow-Up    Other     Astelin nasal spray was giving pt headaches, stopped using after 1 wk. HPI:     HPI  Pt here for fu. Reports the astelin caused headaches and had to stop it. Reports stomach is better on pepcid but still feels like something is in throat when he swallows. He points to his [place] area. States over the past few months voice has been giving out on him. We also reviewed his labs with his history and labs it suggest RA or PA.   We will refer to rheum     Patient Active Problem List   Diagnosis    Sleep disturbance    Fatigue    Vitamin D deficiency    Eczema        Past Medical History:   Diagnosis Date    Allergic rhinitis     Broken nose     Broken ribs     Dermatitis     scalp    Headache     History of blood clots     Right leg    Osteoarthritis     stomach ulcer       Past Surgical History:   Procedure Laterality Date    CHOLECYSTECTOMY  9-9-14    CHOLECYSTECTOMY  09/2014    COSMETIC SURGERY  2013    nose reset    ENDOSCOPY, COLON, DIAGNOSTIC       Family History   Problem Relation Age of Onset    Cancer Mother         breast    Diabetes Father     Psoriasis Sister     Other Sister         brain tumor     Social History     Tobacco Use    Smoking status: Never Smoker    Smokeless tobacco: Never Used   Substance Use Topics    Alcohol use: Yes      Current Outpatient Medications   Medication Sig Dispense Refill    Naproxen Sodium (ALEVE) 220 MG CAPS Take 1 capsule by mouth as needed for Pain      Famotidine (PEPCID AC PO) Take 1 tablet by mouth as needed      famotidine (PEPCID) 20 MG tablet Take 1 tablet by mouth 2 times daily 180 tablet 1    betamethasone valerate (LUXIQ) 0.12 % FOAM foam Apply topically as needed (eczema) Indications: for scalp 100 g 5    Loratadine-Pseudoephedrine (CLARITIN-D 12 HOUR PO) Take by mouth      fluticasone (FLONASE) 50 MCG/ACT nasal spray 2 sprays by Nasal route daily 1 Bottle 0    Multiple Vitamins-Minerals (MULTIVITAMIN ADULT PO) daily.  calcium carbonate (TUMS) 500 MG chewable tablet Take 1 tablet by mouth daily      acetaminophen (TYLENOL) 500 MG tablet Take 500 mg by mouth every 6 hours as needed for Pain.  azelastine (ASTELIN) 0.1 % nasal spray 2 sprays by Nasal route 2 times daily Use in each nostril as directed (Patient not taking: Reported on 9/8/2020) 4 Bottle 1     No current facility-administered medications for this visit. Allergies   Allergen Reactions    Augmentin [Amoxicillin-Pot Clavulanate]     Neosporin [Neomycin-Polymyxin-Gramicidin] Rash     Health Maintenance   Topic Date Due    DTaP/Tdap/Td vaccine (1 - Tdap) 10/01/1995    Flu vaccine (1) 09/01/2020    Diabetes screen  10/07/2022    Lipid screen  10/07/2024    HIV screen  Completed    Hepatitis A vaccine  Aged Out    Hepatitis B vaccine  Aged Out    Hib vaccine  Aged Out    Meningococcal (ACWY) vaccine  Aged Out    Pneumococcal 0-64 years Vaccine  Aged Out       Subjective:     Review of Systems   Constitutional: Negative. HENT: Positive for congestion, trouble swallowing and voice change. Eyes: Negative. Respiratory: Negative. Cardiovascular: Negative. Gastrointestinal: Negative. Musculoskeletal: Positive for arthralgias. Skin: Negative. Neurological: Negative. Objective:     Vitals:    09/08/20 1546   BP: 122/84   Site: Left Upper Arm   Position: Sitting   Cuff Size: Small Adult   Pulse: 95   Resp: 14   Temp: 97.1 °F (36.2 °C)   TempSrc: Temporal   SpO2: 98%   Weight: 197 lb (89.4 kg)   Height: 6' (1.829 m)       Physical Exam  Constitutional:       Appearance: He is well-developed. HENT:      Head: Normocephalic.       Right Ear: Tympanic membrane and external ear normal.      Left Ear: Tympanic membrane and external ear normal.      Nose: Nose normal.   Neck:      Musculoskeletal: Normal range of motion and neck supple. Cardiovascular:      Rate and Rhythm: Normal rate and regular rhythm. Heart sounds: Normal heart sounds. No murmur. No friction rub. No gallop. Pulmonary:      Effort: Pulmonary effort is normal.      Breath sounds: Normal breath sounds. No wheezing or rales. Abdominal:      General: Bowel sounds are normal.      Palpations: Abdomen is soft. Tenderness: There is no abdominal tenderness. There is no guarding. Musculoskeletal: Normal range of motion. Arms:    Lymphadenopathy:      Cervical: No cervical adenopathy. Skin:     General: Skin is warm. Neurological:      Mental Status: He is alert and oriented to person, place, and time. Deep Tendon Reflexes: Reflexes are normal and symmetric. Assessment:      Diagnosis Orders   1. Cami Cruz MD, Otolaryngology, [facility] [address]   2. Globus sensation  Kamala Villegas MD, Otolaryngology, [place] [place]I [address]   3. HLA B27 positive     4. Polyarthralgia     5. Gastroesophageal reflux disease with esophagitis         Plan:      Return in about 3 months (around 12/8/2020). Orders Placed This Encounter   Procedures   Kam[facility] Villegas MD, [facility] [address]     Referral Priority:   Routine     Referral Type:   Eval and Treat     Referral Reason:   Specialty Services Required     Referred to Provider:   Thee Armas MD     Requested Specialty:   Otolaryngology     Number of Visits Requested:   1     No orders of the defined types were placed in this encounter. Refer to rheum  Also refer to ENT. I suspect we need to look at his vocal chords. May have vocal chord nodule      Patient given educational materials - seepatient instructions. Discussed use, benefit, and side effects of prescribed medications. All patient questions answered. Pt voiced understanding. Patient agreed withtreatment plan. Follow up as directed.      Electronically signed by Lance Gracia AKHIL Ochoa - CNP on 9/8/2020 at 6:10 PM

## 2020-09-20 ENCOUNTER — PATIENT MESSAGE (OUTPATIENT)
Dept: FAMILY MEDICINE CLINIC | Age: 45
End: 2020-09-20

## 2020-09-22 ENCOUNTER — HOSPITAL ENCOUNTER (OUTPATIENT)
Age: 45
Discharge: HOME | End: 2020-09-22
Payer: COMMERCIAL

## 2020-09-22 DIAGNOSIS — R05.9 COUGH: ICD-10-CM

## 2020-09-22 PROCEDURE — U0003 INFECTIOUS AGENT DETECTION BY NUCLEIC ACID (DNA OR RNA); SEVERE ACUTE RESPIRATORY SYNDROME CORONAVIRUS 2 (SARS-COV-2) (CORONAVIRUS DISEASE [COVID-19]), AMPLIFIED PROBE TECHNIQUE, MAKING USE OF HIGH THROUGHPUT TECHNOLOGIES AS DESCRIBED BY CMS-2020-01-R: HCPCS

## 2020-09-24 LAB — SARS-COV-2: NOT DETECTED

## 2020-09-25 ENCOUNTER — PATIENT MESSAGE (OUTPATIENT)
Dept: FAMILY MEDICINE CLINIC | Age: 45
End: 2020-09-25

## 2020-09-25 RX ORDER — METHYLPREDNISOLONE 4 MG/1
TABLET ORAL
Qty: 1 KIT | Refills: 0 | Status: SHIPPED | OUTPATIENT
Start: 2020-09-25 | End: 2022-08-08 | Stop reason: SDUPTHER

## 2020-09-25 RX ORDER — LEVOFLOXACIN 500 MG/1
500 TABLET, FILM COATED ORAL DAILY
Qty: 7 TABLET | Refills: 0 | Status: SHIPPED | OUTPATIENT
Start: 2020-09-25 | End: 2022-08-08 | Stop reason: SDUPTHER

## 2020-09-25 NOTE — TELEPHONE ENCOUNTER
From: Jessica De León  To: , APRN - CNP  Sent: 9/25/2020 8:17 AM EDT  Subject: Non-Urgent Medical Question    Dr. Adan Sifuentes have to set up a visit for a physical and ill get the blood test run when im in [facility] again. Also im still sick as a dog. It almost reminds me of when I had bronchitis,can barely talk, sore throat ,coughing a lot and bring up yellow phlegm. I figured I couldn't have a personal visit so is this something you can send me in a prescription for? Thanks,  Cem Armenta      ----- Message -----   From:AKHIL Amaral CNP   AUCD:4/52/9561 10:14 AM EDT   To:Samy Amato   Subject:RE: Non-Urgent Medical Question    This is a 2nd message. I have placed the order for the lipid panel. You can come in and have done any morning Monday-Thursday in our office. If you want to get done at a different time you can go next door at the ambulatory care center. They are open 24 hours a day for labs. You will need to fast 12 hours. As long as you just need labs that should satisfy your work requirement. Some employers also need documentation that a person came in for a routine physical exam. If you need that you will need to schedule and additional visit to satisfy that requirement  Yoni      ----- Message -----   Lázaro Curtis   Sent:9/22/2020 9:25 AM EDT   To:AKHIL Amaral CNP   Subject:RE: Non-Urgent Medical Question    Dr Monte Opitz,   Our whole household is sick right now cough, conjestion, sore throat, fever,etc... To [facility] its [facility] cold but son said he couldnt taste or smell anything and has been really congested the last couple days. So l took the last couple days off work so i wouldn't put everyone in a panic at work. So the question is should I get tested or not because I can't keep taking work off.   Thanks,  Cem Armenta      ----- Message -----   From:AKHIL Amaral CNP   LBHT:0/96/0228 11:12 AM EDT   To:Samy Uriarte Parkinson   Subject:RE: Non-Urgent Medical Question    Abdiel Arenas Cornea  The other test was a psa. That is a test for prostate cancer. I did order that on you the first visit and it was ok. The other test you likely need is a cholesterol panel. I did not order that. If you know what lab you will get it done at let me know and I will place the order. Yoni      ----- Message -----   Phuc Mchugh   Sent:9/20/2020 12:33 PM EDT   To:Yoni Ochoa, APRN - CNP   Subject:Non-Urgent Medical Question    ,  I have a couple questions per prior vist    and general. Since  designating you as my PCP i have my annual wellness chek per work for insurance. Will the blood work i just had done work for this or will i have to get another? I can get you the paper work if needed. Also on an earlier visit you said something about another test i needed since im over 40. I 'm not sure what it was but if this is needed let me know and where to get it set up.     Thanks,  Masoud Morales

## 2020-10-05 ENCOUNTER — OFFICE VISIT (OUTPATIENT)
Dept: ENT CLINIC | Age: 45
End: 2020-10-05
Payer: COMMERCIAL

## 2020-10-05 ENCOUNTER — TELEPHONE (OUTPATIENT)
Dept: ENT CLINIC | Age: 45
End: 2020-10-05

## 2020-10-05 VITALS
TEMPERATURE: 97.8 F | BODY MASS INDEX: 24.92 KG/M2 | SYSTOLIC BLOOD PRESSURE: 122 MMHG | HEIGHT: 74 IN | WEIGHT: 194.2 LBS | RESPIRATION RATE: 12 BRPM | HEART RATE: 78 BPM | DIASTOLIC BLOOD PRESSURE: 80 MMHG

## 2020-10-05 PROBLEM — J04.0 LARYNGITIS FROM REFLUX OF STOMACH ACID: Status: ACTIVE | Noted: 2020-10-05

## 2020-10-05 PROBLEM — K21.01 GASTROESOPHAGEAL REFLUX DISEASE WITH ESOPHAGITIS AND HEMORRHAGE: Status: ACTIVE | Noted: 2020-10-05

## 2020-10-05 PROBLEM — J02.9 REFLUX PHARYNGITIS: Status: ACTIVE | Noted: 2020-10-05

## 2020-10-05 PROBLEM — R13.14 PHARYNGOESOPHAGEAL DYSPHAGIA: Status: ACTIVE | Noted: 2020-10-05

## 2020-10-05 PROBLEM — K22.2 PEPTIC STRICTURE OF ESOPHAGUS: Status: ACTIVE | Noted: 2020-10-05

## 2020-10-05 PROBLEM — K14.1 GEOGRAPHIC TONGUE: Status: ACTIVE | Noted: 2020-10-05

## 2020-10-05 PROBLEM — J35.8 TONSILLOLITH: Status: ACTIVE | Noted: 2020-10-05

## 2020-10-05 PROBLEM — K22.9 DISORDER OF UPPER ESOPHAGEAL SPHINCTER: Status: ACTIVE | Noted: 2020-10-05

## 2020-10-05 PROBLEM — K21.9 LARYNGITIS FROM REFLUX OF STOMACH ACID: Status: ACTIVE | Noted: 2020-10-05

## 2020-10-05 PROCEDURE — 99204 OFFICE O/P NEW MOD 45 MIN: CPT | Performed by: OTOLARYNGOLOGY

## 2020-10-05 RX ORDER — OMEPRAZOLE 40 MG/1
40 CAPSULE, DELAYED RELEASE ORAL
Qty: 180 CAPSULE | Refills: 3 | Status: SHIPPED | OUTPATIENT
Start: 2020-10-05 | End: 2021-10-13

## 2020-10-05 NOTE — PROGRESS NOTES
[facility] PROFESSIONAL SERVS  [facility] EAR, NOSE AND THROAT  Negin Davila [phone] [place] [id number]  Dept: [phone]  Dept Fax: [phone]  Loc: [phone]    Clifton Cerrato is a 41 y.o. male who was referred by AKHIL Garcia -* for:  Chief Complaint   Patient presents with    New Patient     New patient is here for globus sensation and hoarseness. Referred by Yudith LANDAVERDE - OLEG. HPI:     Clifton Cerrato is a 41 y.o. male complaining of a mass underneath his tongue as well as, and more importantly chronic phlegm and difficulty swallowing for at least the last 6 months. He states that pills and solid foods have begun to get stuck in that region and slowly passed into his esophagus. In addition he is noticing that his voice is cutting in and out more frequently. He gets phlegm in his throat that if he clears his voice will improve. He is also having regular episodes of \"indigestion\" and heartburn despite the fact that he is still on some form of acid suppression; currently Pepcid. He has had GI evaluations in the past 1 of which found him to have severe chronic gastritis with ulcers as well as pyloric stenosis. He had his pylorus stretched and he feels like his symptoms improved. In addition he has blood in his sputum with some regularity. He states that it is always a tiny amount like flecks of blood or streaking of blood and not blood clots. He is a never smoker. He has noticed that the Claritin-D that he was started on for his phlegm has improved some of his symptoms but did not change his dysphagia. He is recently been diagnosed with some form of rheumatological problem in an effort to wear a work-up pain and stiffness of his hands that include a cramping process that he experiences at work and usually responds to letting go of the instrument is working on and shaking of his hand.   He denies any focal joint pain and has had no joints that of themselves become swollen and tender. Not only has he never smoked but he has never dipped tobacco or V8. He has no family history of cancer. He is unaware of having a hiatal hernia or not. He has been off of proton pump inhibitors for the last 6 months (beginning just about the start of this problem) because he broke 2 ribs in rapid succession both associated with blunt trauma to his chest but read that proton pump inhibitor therapy can be associated with leaching of bone calcium so he stopped the medication. He had been on PPIs and tolerating them well for a year prior to this. In an effort to control his own reflux he decreased his soda intake which used to irritate his stomach and cause regurgitation symptoms to a very distinct degree. He also decreased his coffee down to 1 cup/day. History: Allergies   Allergen Reactions    Augmentin [Amoxicillin-Pot Clavulanate]     Neosporin [Neomycin-Polymyxin-Gramicidin] Rash     Current Outpatient Medications   Medication Sig Dispense Refill    Naproxen Sodium (ALEVE) 220 MG CAPS Take 1 capsule by mouth as needed for Pain      Famotidine (PEPCID AC PO) Take 1 tablet by mouth as needed      famotidine (PEPCID) 20 MG tablet Take 1 tablet by mouth 2 times daily 180 tablet 1    betamethasone valerate (LUXIQ) 0.12 % FOAM foam Apply topically as needed (eczema) Indications: for scalp 100 g 5    Loratadine-Pseudoephedrine (CLARITIN-D 12 HOUR PO) Take by mouth      fluticasone (FLONASE) 50 MCG/ACT nasal spray 2 sprays by Nasal route daily 1 Bottle 0    Multiple Vitamins-Minerals (MULTIVITAMIN ADULT PO) daily.  calcium carbonate (TUMS) 500 MG chewable tablet Take 1 tablet by mouth daily      acetaminophen (TYLENOL) 500 MG tablet Take 500 mg by mouth every 6 hours as needed for Pain. No current facility-administered medications for this visit.       Past Medical History:   Diagnosis Date    Allergic rhinitis     Broken nose     Broken ribs     Dermatitis     scalp    Headache     History of blood clots     Right leg    Osteoarthritis     stomach ulcer       Past Surgical History:   Procedure Laterality Date    CHOLECYSTECTOMY  9-9-14    CHOLECYSTECTOMY  09/2014    COSMETIC SURGERY  2013    nose reset    ENDOSCOPY, COLON, DIAGNOSTIC       Family History   Problem Relation Age of Onset    Cancer Mother         breast    Diabetes Father     Psoriasis Sister     Other Sister         brain tumor     Social History     Tobacco Use    Smoking status: Never Smoker    Smokeless tobacco: Never Used   Substance Use Topics    Alcohol use: Yes        Subjective:      Review of Systems  Rest of review of systems are negative, except as noted in HPI. Objective:     /80 (Site: Left Upper Arm, Position: Sitting)   Pulse 78   Temp 97.8 °F (36.6 °C) (Infrared)   Resp 12   Ht 6' 2\" (1.88 m)   Wt 194 lb 3.2 oz (88.1 kg)   BMI 24.93 kg/m²     Physical Exam       On general physical exam the patient is a pleasant well-appearing young middle-aged adult in no acute distress. His voice is mildly low in pitch but otherwise clear in character for his age and gender. His speech pattern is normal.  I heard no throat clearing coughing or inspiratory stridor. His ears were bilaterally within normal limits to otoscopy from his middle ear structures through his tympanic membranes ear canals and pinna. His nose was bilaterally patent. His face was atraumatic. He was normocephalic. His oropharynx and oral cavity were abnormal for the presence of a thick white coating over his tongue and a geographic tongue appearance. He had tonsilloliths obvious on both sides and 1 mucous retention cyst on the left superior pole of his tonsil. His tongue base appeared enlarged. He had a class I Mallampati oropharyngeal aperture. His soft palate was normal in size and aperture.   His floor mouth was abnormal for the presence of a right-sided intracanalicular stone of his sublingual gland. It was easily palpated and nontender. No evidence of obstruction of the duct by the stone could be seen. On bimanual palpation it was completely nontender but clearly firm to hard in consistency. His lungs were clear to auscultation. He had no CVA tenderness. His neck had no adenopathy or thyromegaly. His heart had a regular rate and rhythm with a split S1 of unclear significance. His abdomen was soft and nontender even in the epigastric area. He had normal bowel sounds and no masses. His upper extremities had 2+ pulses and normal capillary refill. He had normal symmetrical strength. His lower extremities were free of edema. Vitals reviewed. No results found. Lab Results   Component Value Date     08/24/2020     10/07/2019     03/28/2019    K 4.3 08/24/2020    K 4.0 10/07/2019    K 4.5 03/28/2019     08/24/2020     10/07/2019     03/28/2019    CO2 25 08/24/2020    CO2 27 10/07/2019    CO2 26 03/28/2019    BUN 11 08/24/2020    BUN 11 10/07/2019    BUN 10 03/28/2019    CREATININE 0.8 08/24/2020    CREATININE 0.86 10/07/2019    CREATININE 0.80 03/28/2019    CALCIUM 9.9 08/24/2020    CALCIUM 9.5 10/07/2019    CALCIUM 9.4 03/28/2019    PROT 6.9 08/24/2020    PROT 7.7 03/28/2019    PROT 7.7 11/06/2018    LABALBU 4.6 08/24/2020    LABALBU 4.7 03/28/2019    LABALBU 4.5 11/06/2018    BILITOT 1.3 08/24/2020    BILITOT 1.20 03/28/2019    BILITOT 1.47 11/06/2018    ALKPHOS 59 08/24/2020    ALKPHOS 65 03/28/2019    ALKPHOS 64 11/06/2018    AST 22 08/24/2020    AST 34 03/28/2019    AST 21 11/06/2018    ALT 29 08/24/2020    ALT 52 03/28/2019    ALT 30 11/06/2018       All of the past medical history, past surgical history, family history,social history, allergies and current medications were reviewed with the patient. Assessment & Plan   Diagnoses and all orders for this visit:     Diagnosis Orders   1. Laryngitis from reflux of stomach acid     2. Reflux pharyngitis     3. Pharyngoesophageal dysphagia     4. Gastroesophageal reflux disease with esophagitis and hemorrhage     5. Geographic tongue     6. Tonsillolith     7. Disorder of upper esophageal sphincter     8. Peptic stricture of esophagus      Likely pathology         Based on his history and these physical findings, the majority of the problems he presents with can be attributable to chronic extra esophageal reflux. His dysphagia most likely is a manifestation of chronic upper esophageal sphincter stimulation by intraesophageal acid that is producing a high resistance channel through which he swallows. There may also be a stricture there. In addition his throat clearing and \"phlegm\" may in fact be chronic reflux of gastric secretions which are notoriously difficult to clear. His geographic tongue and chronic pharyngitis evident in the exam of his mucous membranes of his mouth, are likely also worsened by his tendency towards extra esophageal reflux. To that end I recommended that he go on a distinct antireflux diet completely avoiding caffeine carbonation citrus chocolate and cocktails or all forms of alcohol. I also want him to be on a twice daily proton pump inhibitor regimen of 40 mg of omeprazole 30 minutes before breakfast and dinner. Before he returns to see me, specifically a day or week before, I want him to have a barium esophagram (not a modified barium swallow study) to look at his upper esophageal function and determine something about his tendency to reflux and whether he has a hiatal hernia or not. If his symptoms have dramatically improved we can continue to proceed with medical and behavioral management whether he needs an esophageal inlet dilation or not. If on the other hand his symptoms are still significant and he has structural pathology of the esophagus, I will likely recommend that he see a GI surgeon and be considered for anti-reflux surgery.   He may be an excellent

## 2020-10-05 NOTE — TELEPHONE ENCOUNTER
Message left for Nighat Jonesutant to call [address] [facility] back to schedule esophagram and nine week follow up from 10/5/20.

## 2020-10-06 ENCOUNTER — OFFICE VISIT (OUTPATIENT)
Dept: FAMILY MEDICINE CLINIC | Age: 45
End: 2020-10-06
Payer: COMMERCIAL

## 2020-10-06 VITALS
SYSTOLIC BLOOD PRESSURE: 122 MMHG | HEIGHT: 74 IN | WEIGHT: 191.2 LBS | DIASTOLIC BLOOD PRESSURE: 82 MMHG | HEART RATE: 102 BPM | TEMPERATURE: 97.2 F | RESPIRATION RATE: 14 BRPM | OXYGEN SATURATION: 98 % | BODY MASS INDEX: 24.54 KG/M2

## 2020-10-06 PROCEDURE — 99396 PREV VISIT EST AGE 40-64: CPT | Performed by: NURSE PRACTITIONER

## 2020-10-06 ASSESSMENT — ENCOUNTER SYMPTOMS
GASTROINTESTINAL NEGATIVE: 1
EYES NEGATIVE: 1
RESPIRATORY NEGATIVE: 1

## 2020-10-06 NOTE — PROGRESS NOTES
Fish Holliday is a 41 y.o. male whopresents today for :  Chief Complaint   Patient presents with    Annual Exam       HPI:     HPI  Pt here for annual exam  He has visit scheduled with rheumatology,  Also saw ENT  He needs lipids done     Patient Active Problem List   Diagnosis    Sleep disturbance    Fatigue    Vitamin D deficiency    Eczema    Reflux pharyngitis    Laryngitis from reflux of stomach acid    Pharyngoesophageal dysphagia    Gastroesophageal reflux disease with esophagitis and hemorrhage    Geographic tongue    Tonsillolith    Disorder of upper esophageal sphincter    Peptic stricture of esophagus        Past Medical History:   Diagnosis Date    Allergic rhinitis     Broken nose     Broken ribs     Dermatitis     scalp    Headache     History of blood clots     Right leg    Osteoarthritis     stomach ulcer       Past Surgical History:   Procedure Laterality Date    CHOLECYSTECTOMY  9-9-14    CHOLECYSTECTOMY  09/2014    COSMETIC SURGERY  2013    nose reset    ENDOSCOPY, COLON, DIAGNOSTIC       Family History   Problem Relation Age of Onset    Cancer Mother         breast    Diabetes Father     Psoriasis Sister     Other Sister         brain tumor     Social History     Tobacco Use    Smoking status: Never Smoker    Smokeless tobacco: Never Used   Substance Use Topics    Alcohol use: Yes      Current Outpatient Medications   Medication Sig Dispense Refill    omeprazole (PRILOSEC) 40 MG delayed release capsule Take 1 capsule by mouth 2 times daily (before meals) 30 minutes before meals 180 capsule 3    Naproxen Sodium (ALEVE) 220 MG CAPS Take 1 capsule by mouth as needed for Pain      betamethasone valerate (LUXIQ) 0.12 % FOAM foam Apply topically as needed (eczema) Indications: for scalp 100 g 5    Loratadine-Pseudoephedrine (CLARITIN-D 12 HOUR PO) Take by mouth      fluticasone (FLONASE) 50 MCG/ACT nasal spray 2 sprays by Nasal route daily 1 Bottle 0    Multiple Vitamins-Minerals (MULTIVITAMIN ADULT PO) daily.  calcium carbonate (TUMS) 500 MG chewable tablet Take 1 tablet by mouth daily      acetaminophen (TYLENOL) 500 MG tablet Take 500 mg by mouth every 6 hours as needed for Pain.  Famotidine (PEPCID AC PO) Take 1 tablet by mouth as needed       No current facility-administered medications for this visit. Allergies   Allergen Reactions    Augmentin [Amoxicillin-Pot Clavulanate]     Neosporin [Neomycin-Polymyxin-Gramicidin] Rash     Health Maintenance   Topic Date Due    DTaP/Tdap/Td vaccine (1 - Tdap) 10/01/1995    Flu vaccine (1) 09/01/2020    Lipid screen  10/07/2024    HIV screen  Completed    Hepatitis A vaccine  Aged Out    Hepatitis B vaccine  Aged Out    Hib vaccine  Aged Out    Meningococcal (ACWY) vaccine  Aged Out    Pneumococcal 0-64 years Vaccine  Aged Out       Subjective:     Review of Systems   Constitutional: Negative. HENT: Negative. Eyes: Negative. Respiratory: Negative. Cardiovascular: Negative. Gastrointestinal: Negative. Musculoskeletal: Negative. Skin: Negative. Neurological: Negative. Objective:     Vitals:    10/06/20 1528   BP: 122/82   Site: Left Upper Arm   Position: Sitting   Cuff Size: Small Adult   Pulse: 102   Resp: 14   Temp: 97.2 °F (36.2 °C)   TempSrc: Temporal   SpO2: 98%   Weight: 191 lb 3.2 oz (86.7 kg)   Height: 6' 2\" (1.88 m)       Physical Exam  Constitutional:       Appearance: He is well-developed. HENT:      Head: Normocephalic. Right Ear: Tympanic membrane and external ear normal.      Left Ear: Tympanic membrane and external ear normal.      Nose: Nose normal.   Neck:      Musculoskeletal: Normal range of motion and neck supple. Cardiovascular:      Rate and Rhythm: Normal rate and regular rhythm. Heart sounds: Normal heart sounds. No murmur. No friction rub. No gallop.     Pulmonary:      Effort: Pulmonary effort is normal.      Breath sounds: Normal breath sounds. No wheezing or rales. Abdominal:      General: Bowel sounds are normal.      Palpations: Abdomen is soft. Tenderness: There is no abdominal tenderness. There is no guarding. Musculoskeletal: Normal range of motion. Lymphadenopathy:      Cervical: No cervical adenopathy. Skin:     General: Skin is warm. Neurological:      Mental Status: He is alert and oriented to person, place, and time. Deep Tendon Reflexes: Reflexes are normal and symmetric. Assessment:      Diagnosis Orders   1. Routine physical examination         Plan:      No follow-ups on file. No orders of the defined types were placed in this encounter. No orders of the defined types were placed in this encounter. Pt refused flu vaccine  To get lipids    Patient given educational materials - seepatient instructions. Discussed use, benefit, and side effects of prescribed medications. All patient questions answered. Pt voiced understanding. Patient agreed withtreatment plan. Follow up as directed.      Electronically signed by AKHIL Brooks CNP on 10/6/2020 at 6:37 PM

## 2020-10-07 ENCOUNTER — HOSPITAL ENCOUNTER (OUTPATIENT)
Age: 45
Discharge: HOME | End: 2020-10-07
Payer: COMMERCIAL

## 2020-10-07 DIAGNOSIS — Z13.220 SCREENING FOR LIPID DISORDERS: ICD-10-CM

## 2020-10-07 LAB
CHOLESTEROL, TOTAL: 156 MG/DL (ref 100–199)
HDLC SERPL-MCNC: 46 MG/DL
LDL CHOLESTEROL CALCULATED: 82 MG/DL
TRIGL SERPL-MCNC: 139 MG/DL (ref 0–199)

## 2020-10-07 PROCEDURE — 80061 LIPID PANEL: CPT

## 2020-10-07 PROCEDURE — 36415 COLL VENOUS BLD VENIPUNCTURE: CPT

## 2020-12-03 ENCOUNTER — TELEPHONE (OUTPATIENT)
Dept: ENT CLINIC | Age: 45
End: 2020-12-03

## 2020-12-03 NOTE — TELEPHONE ENCOUNTER
Sb Keith called to reschedule his appointment with Dr. Alexandria Lacey 12/9/20 ; the patient has an esophagram 12/4/20 and wants to know if we could provide the results over the phone of the esophagram since his rescheduled appointment is 1/12/21. I informed the patient that results are not always provided over the phone.

## 2020-12-04 ENCOUNTER — HOSPITAL ENCOUNTER (OUTPATIENT)
Dept: GENERAL RADIOLOGY | Age: 45
Discharge: HOME | End: 2020-12-04
Payer: COMMERCIAL

## 2020-12-04 PROCEDURE — 2500000003 HC RX 250 WO HCPCS: Performed by: OTOLARYNGOLOGY

## 2020-12-04 PROCEDURE — 6360000004 HC RX CONTRAST MEDICATION: Performed by: OTOLARYNGOLOGY

## 2020-12-04 PROCEDURE — 74220 X-RAY XM ESOPHAGUS 1CNTRST: CPT

## 2020-12-04 PROCEDURE — A4641 RADIOPHARM DX AGENT NOC: HCPCS | Performed by: OTOLARYNGOLOGY

## 2020-12-04 PROCEDURE — 6370000000 HC RX 637 (ALT 250 FOR IP): Performed by: OTOLARYNGOLOGY

## 2020-12-04 RX ADMIN — BARIUM SULFATE 140 ML: 980 POWDER, FOR SUSPENSION ORAL at 09:40

## 2020-12-04 RX ADMIN — BARIUM SULFATE 355 ML: 0.6 SUSPENSION ORAL at 09:40

## 2020-12-04 RX ADMIN — ANTACID/ANTIFLATULENT 1 EACH: 380; 550; 10; 10 GRANULE, EFFERVESCENT ORAL at 09:41

## 2020-12-07 ENCOUNTER — TELEPHONE (OUTPATIENT)
Dept: ENT CLINIC | Age: 45
End: 2020-12-07

## 2020-12-07 NOTE — TELEPHONE ENCOUNTER
[facility] I informed the patient that Dr. Bia Mcduffie would review the results of the esophagram with the patient.

## 2020-12-07 NOTE — TELEPHONE ENCOUNTER
I contacted Mr. Cano Expose by phone. I reviewed with him his barium esophagram results which showed no strictures and showed no reflux tendency despite the apparent performance of the siphon maneuvers that and to provoke reflux. As far as the potential that the patient has reflux that was not elicited, this is still the case given how strongly has clinical history supports reflux as a significant part of his problem. It also does not rule out spastic sphincters or even esophageal body spasms that are also strongly indicated by his clinical history. I reviewed this with him in further detail when I see him back in my office on January 12. In the meantime he needs to stay on the antireflux dietary regimen and acid suppression to see if his symptoms significantly improve. He agreed.     PFC

## 2020-12-08 ENCOUNTER — OFFICE VISIT (OUTPATIENT)
Dept: FAMILY MEDICINE CLINIC | Age: 45
End: 2020-12-08
Payer: COMMERCIAL

## 2020-12-08 VITALS
HEART RATE: 86 BPM | SYSTOLIC BLOOD PRESSURE: 124 MMHG | OXYGEN SATURATION: 98 % | WEIGHT: 201.2 LBS | TEMPERATURE: 97.2 F | BODY MASS INDEX: 25.83 KG/M2 | DIASTOLIC BLOOD PRESSURE: 78 MMHG

## 2020-12-08 PROCEDURE — 99214 OFFICE O/P EST MOD 30 MIN: CPT | Performed by: NURSE PRACTITIONER

## 2020-12-08 RX ORDER — METRONIDAZOLE 7.5 MG/G
GEL TOPICAL
Qty: 1 TUBE | Refills: 2 | Status: SHIPPED | OUTPATIENT
Start: 2020-12-08 | End: 2021-07-01

## 2020-12-08 ASSESSMENT — ENCOUNTER SYMPTOMS
TROUBLE SWALLOWING: 1
EYES NEGATIVE: 1
RESPIRATORY NEGATIVE: 1
GASTROINTESTINAL NEGATIVE: 1

## 2020-12-08 NOTE — PROGRESS NOTES
Ngozi Ren is a 41 y.o. male whopresents today for :  Chief Complaint   Patient presents with    3 Month Follow-Up       HPI:     HPI  Pt here for fu. Pt is on ppi now. Reflux is better but still has sensation of when swallowing a pill it gets caught righ tin his throat. Has to drink water to get it down. Had normal esophogram.  Still waiting on appt with rheum. His arthralgia are about the same. He does get rosascea at times and ask what is there to help. Patient Active Problem List   Diagnosis    Sleep disturbance    Fatigue    Vitamin D deficiency    Eczema    Reflux pharyngitis    Laryngitis from reflux of stomach acid    Pharyngoesophageal dysphagia    Gastroesophageal reflux disease with esophagitis and hemorrhage    Geographic tongue    Tonsillolith    Disorder of upper esophageal sphincter    Peptic stricture of esophagus        Past Medical History:   Diagnosis Date    Allergic rhinitis     Broken nose     Broken ribs     Dermatitis     scalp    Headache     History of blood clots     Right leg    Osteoarthritis     stomach ulcer       Past Surgical History:   Procedure Laterality Date    CHOLECYSTECTOMY  9-9-14    CHOLECYSTECTOMY  09/2014    COSMETIC SURGERY  2013    nose reset    ENDOSCOPY, COLON, DIAGNOSTIC       Family History   Problem Relation Age of Onset    Cancer Mother         breast    Diabetes Father     Psoriasis Sister     Other Sister         brain tumor     Social History     Tobacco Use    Smoking status: Never Smoker    Smokeless tobacco: Never Used   Substance Use Topics    Alcohol use: Yes      Current Outpatient Medications   Medication Sig Dispense Refill    metroNIDAZOLE (METROGEL) 0.75 % gel Apply topically 2 times daily.  1 Tube 2    omeprazole (PRILOSEC) 40 MG delayed release capsule Take 1 capsule by mouth 2 times daily (before meals) 30 minutes before meals 180 capsule 3    betamethasone valerate (LUXIQ) 0.12 % FOAM foam Apply topically as needed (eczema) Indications: for scalp 100 g 5    Multiple Vitamins-Minerals (MULTIVITAMIN ADULT PO) daily.  calcium carbonate (TUMS) 500 MG chewable tablet Take 1 tablet by mouth daily      Naproxen Sodium (ALEVE) 220 MG CAPS Take 1 capsule by mouth as needed for Pain      Loratadine-Pseudoephedrine (CLARITIN-D 12 HOUR PO) Take by mouth      fluticasone (FLONASE) 50 MCG/ACT nasal spray 2 sprays by Nasal route daily (Patient not taking: Reported on 12/8/2020) 1 Bottle 0    acetaminophen (TYLENOL) 500 MG tablet Take 500 mg by mouth every 6 hours as needed for Pain. No current facility-administered medications for this visit. Allergies   Allergen Reactions    Augmentin [Amoxicillin-Pot Clavulanate]     Neosporin [Neomycin-Polymyxin-Gramicidin] Rash     Health Maintenance   Topic Date Due    DTaP/Tdap/Td vaccine (1 - Tdap) 10/01/1995    Flu vaccine (1) 12/08/2021 (Originally 9/1/2020)    Lipid screen  10/07/2025    HIV screen  Completed    Hepatitis A vaccine  Aged Out    Hepatitis B vaccine  Aged Out    Hib vaccine  Aged Out    Meningococcal (ACWY) vaccine  Aged Out    Pneumococcal 0-64 years Vaccine  Aged Out       Subjective:     Review of Systems   Constitutional: Negative. HENT: Positive for trouble swallowing. Eyes: Negative. Respiratory: Negative. Cardiovascular: Negative. Gastrointestinal: Negative. Musculoskeletal: Positive for arthralgias. Skin: Positive for rash. Neurological: Negative. Objective:     Vitals:    12/08/20 1528   BP: 124/78   Site: Left Upper Arm   Position: Sitting   Cuff Size: Large Adult   Pulse: 86   Temp: 97.2 °F (36.2 °C)   TempSrc: Temporal   SpO2: 98%   Weight: 201 lb 3.2 oz (91.3 kg)       Physical Exam  Constitutional:       Appearance: He is well-developed. HENT:      Head: Normocephalic.       Right Ear: Tympanic membrane and external ear normal.      Left Ear: Tympanic membrane and external ear normal. Nose: Nose normal.   Neck:      Musculoskeletal: Normal range of motion and neck supple. Cardiovascular:      Rate and Rhythm: Normal rate and regular rhythm. Heart sounds: Normal heart sounds. No murmur. No friction rub. No gallop. Pulmonary:      Effort: Pulmonary effort is normal.      Breath sounds: Normal breath sounds. No wheezing or rales. Abdominal:      General: Bowel sounds are normal.      Palpations: Abdomen is soft. Tenderness: There is no abdominal tenderness. There is no guarding. Musculoskeletal: Normal range of motion. Arms:    Lymphadenopathy:      Cervical: No cervical adenopathy. Skin:     General: Skin is warm. Neurological:      Mental Status: He is alert and oriented to person, place, and time. Deep Tendon Reflexes: Reflexes are normal and symmetric. Assessment:      Diagnosis Orders   1. Globus sensation     2. HLA B27 positive     3. Gastroesophageal reflux disease with esophagitis without hemorrhage     4. Acne rosacea  metroNIDAZOLE (METROGEL) 0.75 % gel       Plan:      Return in about 4 months (around 4/8/2021). No orders of the defined types were placed in this encounter. Orders Placed This Encounter   Medications    metroNIDAZOLE (METROGEL) 0.75 % gel     Sig: Apply topically 2 times daily. Dispense:  1 Tube     Refill:  2      Will start metrogel for rosascea  Cont ppi  For his throat he sees ent in January will likely have laryngoscope      Patient given educational materials - seepatient instructions. Discussed use, benefit, and side effects of prescribed medications. All patient questions answered. Pt voiced understanding. Patient agreed withtreatment plan. Follow up as directed.      Electronically signed by AKHIL Alfaro CNP on 12/8/2020 at 6:04 PM

## 2021-01-12 ENCOUNTER — OFFICE VISIT (OUTPATIENT)
Dept: ENT CLINIC | Age: 46
End: 2021-01-12
Payer: COMMERCIAL

## 2021-01-12 VITALS
SYSTOLIC BLOOD PRESSURE: 110 MMHG | BODY MASS INDEX: 25.73 KG/M2 | TEMPERATURE: 97.2 F | DIASTOLIC BLOOD PRESSURE: 78 MMHG | HEART RATE: 68 BPM | RESPIRATION RATE: 16 BRPM | WEIGHT: 200.4 LBS

## 2021-01-12 DIAGNOSIS — J02.9 REFLUX PHARYNGITIS: Primary | ICD-10-CM

## 2021-01-12 DIAGNOSIS — K21.9 LARYNGITIS FROM REFLUX OF STOMACH ACID: ICD-10-CM

## 2021-01-12 DIAGNOSIS — J04.0 LARYNGITIS FROM REFLUX OF STOMACH ACID: ICD-10-CM

## 2021-01-12 DIAGNOSIS — J35.8 TONSILLOLITH: ICD-10-CM

## 2021-01-12 DIAGNOSIS — R13.10 PILL DYSPHAGIA: ICD-10-CM

## 2021-01-12 PROCEDURE — 92511 NASOPHARYNGOSCOPY: CPT | Performed by: OTOLARYNGOLOGY

## 2021-01-12 PROCEDURE — 99215 OFFICE O/P EST HI 40 MIN: CPT | Performed by: OTOLARYNGOLOGY

## 2021-01-12 NOTE — PROGRESS NOTES
[address] EAR[facility] NOSE [facility] THROAT  [address] [place] [address] [place] [address]  Dept: [phone]  Dept Fax: [phone]  Loc: [phone]    Horton Riedel is a 41 y.o. male who was referred by No ref. provider found for:  Chief Complaint   Patient presents with    Results     Patient here for 2 month folllow up and esophagram results. HPI:     Horton Riedel is a 41 y.o. male who is being treated for chronic extra soft to reflux and associated dysphagia. He returns today for a follow-up evaluation along with a flexible pharyngoscopy. He reports his obstructive dysphagia symptoms are mild but absent. He does have some tendency to get a pill stuck in his right vallecula and desires to know that there is not something \"that should not be there\" in the back of his throat at about the level of his vallecula during his evaluation today. He is still drinking 1 cup of coffee per day. He reports having put on 15 pounds over the holidays and wanting to use his next few months to lose it again. History: Allergies   Allergen Reactions    Augmentin [Amoxicillin-Pot Clavulanate]     Neosporin [Neomycin-Polymyxin-Gramicidin] Rash     Current Outpatient Medications   Medication Sig Dispense Refill    metroNIDAZOLE (METROGEL) 0.75 % gel Apply topically 2 times daily. 1 Tube 2    omeprazole (PRILOSEC) 40 MG delayed release capsule Take 1 capsule by mouth 2 times daily (before meals) 30 minutes before meals 180 capsule 3    Naproxen Sodium (ALEVE) 220 MG CAPS Take 1 capsule by mouth as needed for Pain      betamethasone valerate (LUXIQ) 0.12 % FOAM foam Apply topically as needed (eczema) Indications: for scalp 100 g 5    Loratadine-Pseudoephedrine (CLARITIN-D 12 HOUR PO) Take by mouth      fluticasone (FLONASE) 50 MCG/ACT nasal spray 2 sprays by Nasal route daily 1 Bottle 0    Multiple Vitamins-Minerals (MULTIVITAMIN ADULT PO) daily.       calcium carbonate (TUMS) 500 MG chewable tablet Take 1 tablet by mouth daily      acetaminophen (TYLENOL) 500 MG tablet Take 500 mg by mouth every 6 hours as needed for Pain. No current facility-administered medications for this visit. Past Medical History:   Diagnosis Date    Allergic rhinitis     Broken nose     Broken ribs     Dermatitis     scalp    Headache     History of blood clots     Right leg    Osteoarthritis     stomach ulcer       Past Surgical History:   Procedure Laterality Date    CHOLECYSTECTOMY  9-9-14    CHOLECYSTECTOMY  09/2014    COSMETIC SURGERY  2013    nose reset    ENDOSCOPY, COLON, DIAGNOSTIC       Family History   Problem Relation Age of Onset    Cancer Mother         breast    Diabetes Father     Psoriasis Sister     Other Sister         brain tumor     Social History     Tobacco Use    Smoking status: Never Smoker    Smokeless tobacco: Never Used   Substance Use Topics    Alcohol use: Yes        Subjective:      Review of Systems  Rest of review of systems are negative, except as noted in HPI. Objective:     /78 (Site: Left Upper Arm, Position: Sitting)   Pulse 68   Temp 97.2 °F (36.2 °C) (Infrared)   Resp 16   Wt 200 lb 6.4 oz (90.9 kg)   BMI 25.73 kg/m²     Physical Exam       On general physical exam the patient is a pleasant alert oriented and cooperative middle-aged adult male in no acute distress. His voice is mildly low in pitch but otherwise clear in character for his age and gender. I heard no throat clearing coughing or inspiratory stridor. His speech pattern was within normal limits. He was neither hyper nor hyponasal.    Procedure: Nasopharyngoscopy  Indication: The patient has chronic extra esophageal reflux and dysphagia with pill trapping likely in his vallecula. He warrants an interval endoscopy to make sure that there is no ulcerated or neoplastic process promoting this dysphagia.   Findings: Patient's tongue base is moderately hypertrophic. His epiglottis was contoured exactly to the curve of the tongue base. Having him extend his tongue out of his mouth, I was able to see anterior to the epiglottis and found him to have 2 deep cul-de-sacs in the vallecula with the right side being significantly deeper than the left. They were filled with saliva so I could not see the body mucosa but nothing of the perimeter looked suspicious for neoplasia. His hypopharynx was fully evaluated. There was no pooling of the piriform sinus. He had mild to moderate post cricoid mucous membrane redundancy. His glottic supraglottic and subglottic apertures were within normal limits. He tolerated the procedure well. Vitals reviewed. No results found. Lab Results   Component Value Date     08/24/2020     10/07/2019     03/28/2019    K 4.3 08/24/2020    K 4.0 10/07/2019    K 4.5 03/28/2019     08/24/2020     10/07/2019     03/28/2019    CO2 25 08/24/2020    CO2 27 10/07/2019    CO2 26 03/28/2019    BUN 11 08/24/2020    BUN 11 10/07/2019    BUN 10 03/28/2019    CREATININE 0.8 08/24/2020    CREATININE 0.86 10/07/2019    CREATININE 0.80 03/28/2019    CALCIUM 9.9 08/24/2020    CALCIUM 9.5 10/07/2019    CALCIUM 9.4 03/28/2019    PROT 6.9 08/24/2020    PROT 7.7 03/28/2019    PROT 7.7 11/06/2018    LABALBU 4.6 08/24/2020    LABALBU 4.7 03/28/2019    LABALBU 4.5 11/06/2018    BILITOT 1.3 08/24/2020    BILITOT 1.20 03/28/2019    BILITOT 1.47 11/06/2018    ALKPHOS 59 08/24/2020    ALKPHOS 65 03/28/2019    ALKPHOS 64 11/06/2018    AST 22 08/24/2020    AST 34 03/28/2019    AST 21 11/06/2018    ALT 29 08/24/2020    ALT 52 03/28/2019    ALT 30 11/06/2018       All of the past medical history, past surgical history, family history,social history, allergies and current medications were reviewed with the patient. Assessment & Plan   Diagnoses and all orders for this visit:     Diagnosis Orders   1.  Reflux pharyngitis     2. Laryngitis from reflux of stomach acid     3. Tonsillolith     4. Pill dysphagia           Based on his history and these physical findings, the patient has a good explanation for his pill dysphagia in the form of a deep cul-de-sac and the vallecula with an epiglottis that is contoured to fit flush on the tongue base probably during most of his swallowing gestures. While this can be repaired with a transoral laser surgical operation, he appears to be handling it well by putting pills in food material or techniques for rinsing the back of his throat effectively though he does not like having to do that. If this problem becomes bad enough, we will discuss surgical treatment for it but for the time being there is little indication. I agree with his interest in losing the excess weight that he picked up over the holidays. I will see him back in approximately 9 months for an interval evaluation and endoscopy. If his symptoms worsen in the meantime I recommend that he contact me so that I can adjust his antireflux regimen. Probably at the end of 9 months I will give him a several week interval where he is off his medicines to see if after he has lost his extra weight if his symptoms are still under reasonable good control. If not we may need to revisit the potential for referring him to GI medicine for reflux evaluation and surgical care. I spent over 40 minutes face-to-face time with the patient more than half of which was spent reviewing his symptom profile and planning his diagnostic and therapeutic program.      Return in about 9 months (around 10/12/2021) for For follow-up evaluation and repeat pharyngoscopy. **This report has been created using voice recognition software. It may contain minor errors which are inherent in voice recognition technology. **

## 2021-03-12 NOTE — PROGRESS NOTES
[place]   Date Of Service: 3/17/2021  Provider: Sridevi Gage DO  Name: Suze Chacon   MRN: [id number]    Chief Complaint(s)    No chief complaint on file. arthirtic changes of the hands, noted by the pcp . History of Present illness (HPI)    Suze Chacon   is a(n)45 y.o. male with a hx of  has a past medical history of Allergic rhinitis, Broken nose, Broken ribs, Dermatitis, Headache, History of blood clots, Osteoarthritis, and stomach ulcer. referred by AKHIL Carballo -* for evaluation of  Psoriasis, polyarthralgia, + HLA-B27 with the concern for Psoriatic arthritis. Symptoms started ~ 5 years ago with abdominal pain & bloating w/o change in BM - s/p gall bladder surgery w/o relief of symptoms. , no prior C-scope. Reported generalized stiffness, lower back/ hip  pain for several years and diagnosed with a herniated disc. Reported stiffness in hands with significant stiffness greatest in the morning over the same times. Relief with movement, tylenol prn. Worse in the morning and improved. ? Joint swelling given the difficulty taking off the wedding band. Previous therapy: chiropractor (worsened pain), naproxen , ibuprofen (Gi upset)     + AM stiffness at least 1 hour, + gelling, + scalp rash  Diagnosed as seborrhea dermatitis by dermatology - treated with topical luxiq. - denies enthesitis, dactylitis, nail changes, hx of STD, + family history of Psoriatic in sister. + dry eyes, tinnitus, lump in thorats. Review of Systems    Review of Systems   Constitutional: Negative for diaphoresis, fatigue and fever. HENT: Positive for tinnitus. Negative for congestion, hearing loss and nosebleeds. Eyes: Negative. Negative for pain and redness. Respiratory: Negative for cough, shortness of breath and wheezing. Cardiovascular: Negative. Negative for chest pain. Gastrointestinal: Negative for constipation, diarrhea, nausea and vomiting. Genitourinary: Negative for difficulty urinating, frequency and hematuria. Musculoskeletal: Positive for arthralgias, back pain and myalgias. Negative for joint swelling. Skin: Negative for rash. Neurological: Positive for weakness and numbness (intermittent in the arms wtih driving. ). Negative for dizziness and headaches. Hematological: Does not bruise/bleed easily. Psychiatric/Behavioral: Negative for sleep disturbance. The patient is not nervous/anxious. PAST MEDICAL HISTORY     has a past medical history of Allergic rhinitis, Broken nose, Broken ribs, Dermatitis, Headache, History of blood clots, Osteoarthritis, and stomach ulcer. PAST SURGICAL HISTORY     has a past surgical history that includes Endoscopy, colon, diagnostic; Cholecystectomy (9-9-14); Cholecystectomy (09/2014); and Cosmetic surgery (2013). FAMILY HISTORY      Family History   Problem Relation Age of Onset    Cancer Mother         breast    Diabetes Father     Psoriasis Sister     Other Sister         brain tumor       SOCIAL HISTORY     reports that he has never smoked. He has never used smokeless tobacco. He reports current alcohol use. He reports that he does not use drugs. ALLERGIES     Allergies   Allergen Reactions    Augmentin [Amoxicillin-Pot Clavulanate]     Neosporin [Neomycin-Polymyxin-Gramicidin] Rash       CURRENT MEDICATIONS      Current Outpatient Medications:     metroNIDAZOLE (METROGEL) 0.75 % gel, Apply topically 2 times daily. , Disp: 1 Tube, Rfl: 2    omeprazole (PRILOSEC) 40 MG delayed release capsule, Take 1 capsule by mouth 2 times daily (before meals) 30 minutes before meals, Disp: 180 capsule, Rfl: 3    Naproxen Sodium (ALEVE) 220 MG CAPS, Take 1 capsule by mouth as needed for Pain, Disp: , Rfl:     betamethasone valerate (LUXIQ) 0.12 % FOAM foam, Apply topically as needed (eczema) Indications: for scalp, Disp: 100 g, Rfl: 5    Loratadine-Pseudoephedrine (CLARITIN-D 12 HOUR PO), Take by mouth, Disp: , Rfl:     fluticasone (FLONASE) 50 MCG/ACT nasal spray, 2 sprays by Nasal route daily, Disp: 1 Bottle, Rfl: 0    Multiple Vitamins-Minerals (MULTIVITAMIN ADULT PO), daily. , Disp: , Rfl:     calcium carbonate (TUMS) 500 MG chewable tablet, Take 1 tablet by mouth daily, Disp: , Rfl:     acetaminophen (TYLENOL) 500 MG tablet, Take 500 mg by mouth every 6 hours as needed for Pain., Disp: , Rfl:     PHYSICAL EXAMINATION / OBJECTIVE     Objective: There were no vitals taken for this visit. General Appearance:   alert and oriented to person, place and time well-developed and well nourished  Physch : appropriate affects ,   Head:  Normocephalic and atraumatic  Eyes: No gross abnormalities. ,  PERRL, Sclera nonicteric, conjunctiva non-INJECTED  ENT:  MMM,  no deformities , NO oral/nasal sores, Non-tender sinuses. Neck:  Neck supple, Non-tender, No  mass, thyromegaly,    Lymph:  No cervical  or  supraclavicular lymph node swelling. Pulmonary/Chest:  CTA bilat. , normal air movement, no respiratory distress  Cardiovascular:  Normal rate, + S1 and S2,  NO murmurs , rubs, gallups,     No edema   :  Deferred   Abd/GI: Deferred   Neurologic:  gait and coordination normal and speech normal  Skin:  Skin color and temp ,  No rashes or lesions  Extremities:  No clubbing ,     Musculoskeletal:  Intact  ROM , 5/5  Strength bilat upper and lower. Upper extremities:   Non-tender, no swelling, intact DTR's bilat upper ext. Neg tinel bilat. Elbows and wrist.     Lower extremities: Non-tender, no swelling, intact DTR's , neg babinski    Spine: C-spine, T-spine & L-spine:  Non- tender,  ROM  Intact  ,  Neg  shober, neg  araceli, neg  Occiput to wall , SLR/Cross SLR.         KEY:  Tender :  T  Swelling: S  Non-tender : NT  No swelling: NS           RAPID3 Composite Score MDHAQ (0-10) + Patient pain VAS (0-10): + Patient global assessment VAS (0-10):     3/12/2021 --- MDHAQ : 0.3 + 0.5 + 0 = 0.8 Remission: <3  Low Disease Activity: <6  Moderate Disease Activity: >=6 and <=12  High Disease Activity: >12    LABS        CBC  Lab Results   Component Value Date    WBC 8.0 08/24/2020    RBC 4.60 08/24/2020    HGB 15.0 08/24/2020    HCT 44.4 08/24/2020    MCV 96.5 08/24/2020    MCH 32.6 08/24/2020    MCHC 33.8 08/24/2020    RDW 12.9 03/28/2019     08/24/2020       CMP  Lab Results   Component Value Date    CALCIUM 9.9 08/24/2020    LABALBU 4.6 08/24/2020    PROT 6.9 08/24/2020     08/24/2020    K 4.3 08/24/2020    CO2 25 08/24/2020     08/24/2020    BUN 11 08/24/2020    CREATININE 0.8 08/24/2020    ALKPHOS 59 08/24/2020    ALT 29 08/24/2020    AST 22 08/24/2020       HgBA1c: No components found for: HGBA1C    Lab Results   Component Value Date    TSH 1.240 08/24/2020     Lab Results   Component Value Date    VITD25 28.2 10/04/2018         Lab Results   Component Value Date    ANASCRN Detected (A) 08/24/2020     No results found for: SSA  No results found for: SSB  No results found for: ANTI-SMITH  No results found for: DSDNAAB   No results found for: ANTIRNP  No results found for: C3, C4  No results found for: CCPAB  Lab Results   Component Value Date    RF < 10 08/24/2020       No components found for: CANCASCRN, APANCASCRN  Lab Results   Component Value Date    SEDRATE 5 08/24/2020     Lab Results   Component Value Date    CRP 0.17 08/24/2020       RADIOLOGY:     EXAMINATION:   CT OF THE PELVIS WITHOUT CONTRAST 12/7/2017 3:50 pm       TECHNIQUE:   CT of the pelvis was performed without the administration of intravenous   contrast.  Multiplanar reformatted images are provided for review.  Dose   modulation, iterative reconstruction, and/or weight based adjustment of the   mA/kV was utilized to reduce the radiation dose to as low as reasonably   achievable.       COMPARISON:   Lumbar spine radiographs dated 3/6/2017       HISTORY:   ORDERING SYSTEM PROVIDED HISTORY: Coccyx pain   TECHNOLOGIST PROVIDED HISTORY:   Additional Contrast?->None   Ordering Physician Provided Reason for Exam: C/o pain in tail bone for 5   months, no injury   Acuity: Chronic   Type of Exam: Unknown       FINDINGS:   The sacrum and coccyx are intact and normal in alignment.  There is no focal   osseous destructive change.  No osteolytic lesion or sclerotic lesion   identified.  The sacroiliac joints are patent and symmetric.  There is no   bony erosion, joint space widening, or ankylosis.  Bilateral hip joints are   intact.  There is minimal joint space narrowing at the hips.  No radiographic   evidence of avascular necrosis.  There is mild disc space narrowing and   endplate spurring at the L5-S1 level.  Mild broad-based disc bulges are noted   at L4-L5 and L5-S1.  No evidence for significant spinal canal stenosis or   neural foraminal narrowing at these levels.  L5 vertebral body height is   preserved.       Muscle attenuation in the pelvis is within normal limits on this unenhanced   study.  No evidence of muscle belly atrophy.       Visualized intrapelvic contents demonstrate mild sigmoid diverticulosis   without evidence of acute diverticulitis.  Visualized portion of the appendix   is normal.  There is no free fluid in the pelvis.           Impression   1.  No acute osseous abnormality in the pelvis.  Specifically, no acute or   suspicious osseous abnormality is identified in the sacrum or coccyx.       2.  No acute process seen in the pelvis.       3.  Minimal degenerative changes at the hips and mild degenerative changes at   the lower lumbar spine.       4.  Mild sigmoid diverticulosis. ASSESSMENT/PLAN      No diagnosis found. Polyarthralgia   HLA-B27: - sister w/ psoriasis, AM stiffness - no synovitis, intact ROm of spine and joint, h/o seborrhea. - discussed this can be seen in up to 9 % of caucasions with only a minority having an Axial spondylarthritis.    (hands, lower back) - inflammatory hx with prolonged morning stiffnes, relief with movement, no red symptoms . Exam without evidence of joint swelling or deformities and intact ROM. fmhx of psoriasis in sister is concerning and would monitoring intermittently at this time or f/u if symptoms worsne. - try celebrex 100mg daily - b/c  intolerance with naproxen and ibuprofen    - recent ESR/CRP normal    - checking x-ray of lumbar spine and hands. - if negative evaluation would try       Return in about 6 months (around 9/17/2021). Electronically signed by Alethea Crowley DO on 3/12/2021 at 7:24 AM    New Prescriptions    No medications on file       3/12/2021       The risks and benefits of my recommendations, as well as other treatment options, benefits and side effects were discussed with the patient today. Questions were answered. Thank you for allowing me to participate in the care of this patient. Please call if there are any questions.

## 2021-03-17 ENCOUNTER — HOSPITAL ENCOUNTER (OUTPATIENT)
Age: 46
Discharge: HOME | End: 2021-03-17
Payer: COMMERCIAL

## 2021-03-17 ENCOUNTER — HOSPITAL ENCOUNTER (OUTPATIENT)
Dept: GENERAL RADIOLOGY | Age: 46
Discharge: HOME | End: 2021-03-17
Payer: COMMERCIAL

## 2021-03-17 ENCOUNTER — OFFICE VISIT (OUTPATIENT)
Dept: RHEUMATOLOGY | Age: 46
End: 2021-03-17
Payer: COMMERCIAL

## 2021-03-17 VITALS
BODY MASS INDEX: 26.05 KG/M2 | SYSTOLIC BLOOD PRESSURE: 112 MMHG | HEIGHT: 74 IN | HEART RATE: 91 BPM | DIASTOLIC BLOOD PRESSURE: 84 MMHG | WEIGHT: 203 LBS | TEMPERATURE: 97.3 F | OXYGEN SATURATION: 98 %

## 2021-03-17 DIAGNOSIS — M54.50 CHRONIC MIDLINE LOW BACK PAIN WITHOUT SCIATICA: ICD-10-CM

## 2021-03-17 DIAGNOSIS — M54.50 CHRONIC MIDLINE LOW BACK PAIN WITHOUT SCIATICA: Primary | ICD-10-CM

## 2021-03-17 DIAGNOSIS — Z15.89 HLA B27 (HLA B27 POSITIVE): ICD-10-CM

## 2021-03-17 DIAGNOSIS — G89.29 CHRONIC MIDLINE LOW BACK PAIN WITHOUT SCIATICA: ICD-10-CM

## 2021-03-17 DIAGNOSIS — G89.29 CHRONIC MIDLINE LOW BACK PAIN WITHOUT SCIATICA: Primary | ICD-10-CM

## 2021-03-17 LAB
ALBUMIN SERPL-MCNC: 4.7 G/DL (ref 3.5–5.1)
ALP BLD-CCNC: 60 U/L (ref 38–126)
ALT SERPL-CCNC: 35 U/L (ref 11–66)
ANION GAP SERPL CALCULATED.3IONS-SCNC: 9 MEQ/L (ref 8–16)
AST SERPL-CCNC: 24 U/L (ref 5–40)
BASOPHILS # BLD: 2 %
BASOPHILS ABSOLUTE: 0.1 THOU/MM3 (ref 0–0.1)
BILIRUB SERPL-MCNC: 1.3 MG/DL (ref 0.3–1.2)
BUN BLDV-MCNC: 10 MG/DL (ref 7–22)
C-REACTIVE PROTEIN: < 0.3 MG/DL (ref 0–1)
CALCIUM SERPL-MCNC: 9.5 MG/DL (ref 8.5–10.5)
CHLORIDE BLD-SCNC: 103 MEQ/L (ref 98–111)
CO2: 28 MEQ/L (ref 23–33)
CREAT SERPL-MCNC: 0.8 MG/DL (ref 0.4–1.2)
EOSINOPHIL # BLD: 3.7 %
EOSINOPHILS ABSOLUTE: 0.2 THOU/MM3 (ref 0–0.4)
ERYTHROCYTE [DISTWIDTH] IN BLOOD BY AUTOMATED COUNT: 11.9 % (ref 11.5–14.5)
ERYTHROCYTE [DISTWIDTH] IN BLOOD BY AUTOMATED COUNT: 40.9 FL (ref 35–45)
GFR SERPL CREATININE-BSD FRML MDRD: > 90 ML/MIN/1.73M2
GLUCOSE BLD-MCNC: 100 MG/DL (ref 70–108)
HCT VFR BLD CALC: 45.1 % (ref 42–52)
HEMOGLOBIN: 15.3 GM/DL (ref 14–18)
IMMATURE GRANS (ABS): 0.03 THOU/MM3 (ref 0–0.07)
IMMATURE GRANULOCYTES: 0.5 %
LYMPHOCYTES # BLD: 31.1 %
LYMPHOCYTES ABSOLUTE: 2 THOU/MM3 (ref 1–4.8)
MCH RBC QN AUTO: 31.6 PG (ref 26–33)
MCHC RBC AUTO-ENTMCNC: 33.9 GM/DL (ref 32.2–35.5)
MCV RBC AUTO: 93.2 FL (ref 80–94)
MONOCYTES # BLD: 9.7 %
MONOCYTES ABSOLUTE: 0.6 THOU/MM3 (ref 0.4–1.3)
NUCLEATED RED BLOOD CELLS: 0 /100 WBC
PLATELET # BLD: 288 THOU/MM3 (ref 130–400)
PMV BLD AUTO: 10.1 FL (ref 9.4–12.4)
POTASSIUM SERPL-SCNC: 4.2 MEQ/L (ref 3.5–5.2)
RBC # BLD: 4.84 MILL/MM3 (ref 4.7–6.1)
SEDIMENTATION RATE, ERYTHROCYTE: 8 MM/HR (ref 0–10)
SEG NEUTROPHILS: 53 %
SEGMENTED NEUTROPHILS ABSOLUTE COUNT: 3.4 THOU/MM3 (ref 1.8–7.7)
SODIUM BLD-SCNC: 140 MEQ/L (ref 135–145)
TOTAL PROTEIN: 7.6 G/DL (ref 6.1–8)
WBC # BLD: 6.5 THOU/MM3 (ref 4.8–10.8)

## 2021-03-17 PROCEDURE — 73130 X-RAY EXAM OF HAND: CPT

## 2021-03-17 PROCEDURE — 99214 OFFICE O/P EST MOD 30 MIN: CPT | Performed by: INTERNAL MEDICINE

## 2021-03-17 PROCEDURE — 80053 COMPREHEN METABOLIC PANEL: CPT

## 2021-03-17 PROCEDURE — 85025 COMPLETE CBC W/AUTO DIFF WBC: CPT

## 2021-03-17 PROCEDURE — 86140 C-REACTIVE PROTEIN: CPT

## 2021-03-17 PROCEDURE — 36415 COLL VENOUS BLD VENIPUNCTURE: CPT

## 2021-03-17 PROCEDURE — 85651 RBC SED RATE NONAUTOMATED: CPT

## 2021-03-17 PROCEDURE — 72100 X-RAY EXAM L-S SPINE 2/3 VWS: CPT

## 2021-03-17 RX ORDER — CELECOXIB 100 MG/1
100 CAPSULE ORAL DAILY
Qty: 60 CAPSULE | Refills: 3 | Status: SHIPPED | OUTPATIENT
Start: 2021-03-17 | End: 2021-07-01

## 2021-03-17 SDOH — HEALTH STABILITY: MENTAL HEALTH: HOW MANY STANDARD DRINKS CONTAINING ALCOHOL DO YOU HAVE ON A TYPICAL DAY?: NOT ASKED

## 2021-03-17 SDOH — HEALTH STABILITY: MENTAL HEALTH: HOW OFTEN DO YOU HAVE A DRINK CONTAINING ALCOHOL?: NOT ASKED

## 2021-03-17 ASSESSMENT — ENCOUNTER SYMPTOMS
EYES NEGATIVE: 1
NAUSEA: 0
DIARRHEA: 0
EYE PAIN: 0
EYE REDNESS: 0
CONSTIPATION: 0
VOMITING: 0
WHEEZING: 0
COUGH: 0
SHORTNESS OF BREATH: 0
BACK PAIN: 1

## 2021-07-01 ENCOUNTER — OFFICE VISIT (OUTPATIENT)
Dept: FAMILY MEDICINE CLINIC | Age: 46
End: 2021-07-01
Payer: COMMERCIAL

## 2021-07-01 VITALS
WEIGHT: 202 LBS | HEART RATE: 88 BPM | RESPIRATION RATE: 16 BRPM | OXYGEN SATURATION: 98 % | SYSTOLIC BLOOD PRESSURE: 108 MMHG | BODY MASS INDEX: 25.92 KG/M2 | TEMPERATURE: 97.1 F | DIASTOLIC BLOOD PRESSURE: 78 MMHG

## 2021-07-01 DIAGNOSIS — K22.9 DISORDER OF UPPER ESOPHAGEAL SPHINCTER: ICD-10-CM

## 2021-07-01 DIAGNOSIS — R09.89 GLOBUS SENSATION: Primary | ICD-10-CM

## 2021-07-01 PROCEDURE — 99213 OFFICE O/P EST LOW 20 MIN: CPT | Performed by: NURSE PRACTITIONER

## 2021-07-01 ASSESSMENT — ENCOUNTER SYMPTOMS
GASTROINTESTINAL NEGATIVE: 1
RESPIRATORY NEGATIVE: 1
EYES NEGATIVE: 1
TROUBLE SWALLOWING: 1

## 2021-07-01 ASSESSMENT — PATIENT HEALTH QUESTIONNAIRE - PHQ9
SUM OF ALL RESPONSES TO PHQ9 QUESTIONS 1 & 2: 0
1. LITTLE INTEREST OR PLEASURE IN DOING THINGS: 0
SUM OF ALL RESPONSES TO PHQ QUESTIONS 1-9: 0
2. FEELING DOWN, DEPRESSED OR HOPELESS: 0

## 2021-07-01 NOTE — PROGRESS NOTES
(LUXIQ) 0.12 % FOAM foam Apply topically as needed (eczema) Indications: for scalp 100 g 5    Loratadine-Pseudoephedrine (CLARITIN-D 12 HOUR PO) Take by mouth      fluticasone (FLONASE) 50 MCG/ACT nasal spray 2 sprays by Nasal route daily 1 Bottle 0    Multiple Vitamins-Minerals (MULTIVITAMIN ADULT PO) daily.  calcium carbonate (TUMS) 500 MG chewable tablet Take 1 tablet by mouth daily      acetaminophen (TYLENOL) 500 MG tablet Take 500 mg by mouth every 6 hours as needed for Pain. No current facility-administered medications for this visit. Allergies   Allergen Reactions    Augmentin [Amoxicillin-Pot Clavulanate]     Neosporin [Neomycin-Polymyxin-Gramicidin] Rash     Health Maintenance   Topic Date Due    Hepatitis C screen  Never done    COVID-19 Vaccine (1) Never done    DTaP/Tdap/Td vaccine (1 - Tdap) Never done    Flu vaccine (1) 09/01/2021    Lipid screen  10/07/2025    HIV screen  Completed    Hepatitis A vaccine  Aged Out    Hepatitis B vaccine  Aged Out    Hib vaccine  Aged Out    Meningococcal (ACWY) vaccine  Aged Out    Pneumococcal 0-64 years Vaccine  Aged Out       Subjective:     Review of Systems   Constitutional: Negative. HENT: Positive for trouble swallowing. Eyes: Negative. Respiratory: Negative. Cardiovascular: Negative. Gastrointestinal: Negative. Musculoskeletal: Negative. Skin: Negative. Neurological: Negative. Objective:     Vitals:    07/01/21 1553   BP: 108/78   Site: Left Upper Arm   Position: Sitting   Cuff Size: Large Adult   Pulse: 88   Resp: 16   Temp: 97.1 °F (36.2 °C)   TempSrc: Temporal   SpO2: 98%   Weight: 202 lb (91.6 kg)       Physical Exam  Constitutional:       Appearance: He is well-developed. HENT:      Head: Normocephalic.       Right Ear: Tympanic membrane and external ear normal.      Left Ear: Tympanic membrane and external ear normal.      Nose: Nose normal.   Cardiovascular:      Rate and Rhythm: Normal rate and regular rhythm. Heart sounds: Normal heart sounds. No murmur heard. No friction rub. No gallop. Pulmonary:      Effort: Pulmonary effort is normal.      Breath sounds: Normal breath sounds. No wheezing or rales. Abdominal:      General: Bowel sounds are normal.      Palpations: Abdomen is soft. Tenderness: There is no abdominal tenderness. There is no guarding. Musculoskeletal:         General: Normal range of motion. Cervical back: Normal range of motion and neck supple. Lymphadenopathy:      Cervical: No cervical adenopathy. Skin:     General: Skin is warm. Neurological:      Mental Status: He is alert and oriented to person, place, and time. Deep Tendon Reflexes: Reflexes are normal and symmetric. Assessment:      Diagnosis Orders   1. Globus sensation     2. Disorder of upper esophageal sphincter         Plan:      No follow-ups on file. No orders of the defined types were placed in this encounter. No orders of the defined types were placed in this encounter. Reviewed report and how that pertains to his symptoms. Pt will fu with ent    Patient given educational materials - seepatient instructions. Discussed use, benefit, and side effects of prescribed medications. All patient questions answered. Pt voiced understanding. Patient agreed withtreatment plan. Follow up as directed.      Electronically signed by AKHIL Pereira CNP on 7/1/2021 at 5:39 PM

## 2021-09-22 ENCOUNTER — OFFICE VISIT (OUTPATIENT)
Dept: RHEUMATOLOGY | Age: 46
End: 2021-09-22
Payer: COMMERCIAL

## 2021-09-22 ENCOUNTER — HOSPITAL ENCOUNTER (OUTPATIENT)
Dept: GENERAL RADIOLOGY | Age: 46
Discharge: HOME | End: 2021-09-22
Payer: COMMERCIAL

## 2021-09-22 ENCOUNTER — HOSPITAL ENCOUNTER (OUTPATIENT)
Age: 46
Discharge: HOME | End: 2021-09-22
Payer: COMMERCIAL

## 2021-09-22 VITALS
BODY MASS INDEX: 26.41 KG/M2 | HEART RATE: 95 BPM | OXYGEN SATURATION: 97 % | HEIGHT: 74 IN | WEIGHT: 205.8 LBS | SYSTOLIC BLOOD PRESSURE: 132 MMHG | DIASTOLIC BLOOD PRESSURE: 80 MMHG

## 2021-09-22 DIAGNOSIS — M54.89 INFLAMMATORY BACK PAIN: Primary | ICD-10-CM

## 2021-09-22 DIAGNOSIS — M46.1 DEGENERATIVE JOINT DISEASE OF SACROILIAC JOINT (HCC): ICD-10-CM

## 2021-09-22 DIAGNOSIS — Z15.89 HLA B27 (HLA B27 POSITIVE): ICD-10-CM

## 2021-09-22 DIAGNOSIS — M54.89 INFLAMMATORY BACK PAIN: ICD-10-CM

## 2021-09-22 PROCEDURE — 72202 X-RAY EXAM SI JOINTS 3/> VWS: CPT

## 2021-09-22 PROCEDURE — 99213 OFFICE O/P EST LOW 20 MIN: CPT | Performed by: INTERNAL MEDICINE

## 2021-09-22 ASSESSMENT — ENCOUNTER SYMPTOMS
COUGH: 0
EYE REDNESS: 0
DIARRHEA: 0
VOMITING: 0
WHEEZING: 0
SHORTNESS OF BREATH: 0
EYE PAIN: 0
NAUSEA: 0
EYES NEGATIVE: 1
CONSTIPATION: 0

## 2021-09-22 NOTE — PROGRESS NOTES
[facility] RHEUMATOLOGY FOLLOW UP NOTE       Date Of Service: 2021  Provider: Madie Richards DO, DO  PCP: AKHIL Pinto - [facility]   Name: Katerina Joseph   MRN: [id number]        History of Present Illness (HPI)     Chief Complaint   Patient presents with    6 Month Follow-Up     back pain         Katerina Joseph  is a(n)45 y.o. male with a hx of  has a past medical history of Allergic rhinitis, Broken nose, Broken ribs, Dermatitis, Headache, History of blood clots, Osteoarthritis, and stomach ulcer.+ HLA-B27   here for the f/u evaluation of the polyarthralgia, and  ? PsA,     celebrex not tolerated b/ cGI upset      mild arthralgia in the Right hips and lower back. Pain 0.5/10 over the past week, timing: mornings. + AM stiffness lasting about 2 hours. Alleviating: movement/activity. Aggravating - inactivity. Active dermatitis in the scalp. REVIEW OF SYSTEMS: (ROS)    Review of Systems   Constitutional: Negative for diaphoresis, fatigue and fever. HENT: Negative for congestion, hearing loss and nosebleeds. Eyes: Negative. Negative for pain and redness. Respiratory: Negative for cough, shortness of breath and wheezing. Cardiovascular: Negative. Negative for chest pain. Gastrointestinal: Negative for constipation, diarrhea, nausea and vomiting. Heart burn   Genitourinary: Negative for difficulty urinating, frequency and hematuria. Musculoskeletal: Negative for myalgias. Skin: Negative for rash. Neurological: Negative for dizziness, weakness and headaches. Hematological: Does not bruise/bleed easily. Psychiatric/Behavioral: Negative for sleep disturbance. The patient is not nervous/anxious. PAST MEDICAL HISTORY     has a past medical history of Allergic rhinitis, Broken nose, Broken ribs, Dermatitis, Headache, History of blood clots, Osteoarthritis, and stomach ulcer. SOCIAL HISTORY     reports that he has never smoked.  He has never used smokeless tobacco. He reports current alcohol use. He reports that he does not use drugs. ALLERGIES     Allergies   Allergen Reactions    Augmentin [Amoxicillin-Pot Clavulanate]     Neosporin [Neomycin-Polymyxin-Gramicidin] Rash       CURRENT MEDICATIONS      Current Outpatient Medications:     omeprazole (PRILOSEC) 40 MG delayed release capsule, Take 1 capsule by mouth 2 times daily (before meals) 30 minutes before meals, Disp: 180 capsule, Rfl: 3    Naproxen Sodium (ALEVE) 220 MG CAPS, Take 1 capsule by mouth as needed for Pain, Disp: , Rfl:     betamethasone valerate (LUXIQ) 0.12 % FOAM foam, Apply topically as needed (eczema) Indications: for scalp, Disp: 100 g, Rfl: 5    Loratadine-Pseudoephedrine (CLARITIN-D 12 HOUR PO), Take by mouth, Disp: , Rfl:     fluticasone (FLONASE) 50 MCG/ACT nasal spray, 2 sprays by Nasal route daily, Disp: 1 Bottle, Rfl: 0    Multiple Vitamins-Minerals (MULTIVITAMIN ADULT PO), daily. , Disp: , Rfl:     calcium carbonate (TUMS) 500 MG chewable tablet, Take 1 tablet by mouth daily, Disp: , Rfl:     acetaminophen (TYLENOL) 500 MG tablet, Take 500 mg by mouth every 6 hours as needed for Pain., Disp: , Rfl:     PHYSICAL EXAMINATION / OBJECTIVE     Objective:  /80 (Site: Left Upper Arm, Position: Sitting, Cuff Size: Large Adult)   Pulse 95   Ht 6' 2.02\" (1.88 m)   Wt 205 lb 12.8 oz (93.4 kg)   SpO2 97%   BMI 26.41 kg/m²     Physical Exam      General Appearance:  AAO x 3 ,  well-developed and well nourished  Head: NCAT  Eyes: No abnormalities. ,  Sclera non-icteric,   Ears / Nose:  normal  appearance  ears and nose. No active drainage   Mouth:  MMM, ears w/o deformities  Neck: No jugular venous distention, appears symmetric, good ROM  Lymph:  no cervical adenopathy    Pulmonary/Chest: CTA bilateral ,  symmetric chest expansion.    Cardiovascular: Normal S1 and S2, NO murmur, rub, gallop  : Deferred   Abd/GI: Deferred   Neurologic: Speech normal, no facial droop,  Skin: NO rash on exposed skin. Musculoskeletal:  Upper extremities: Non-tender,   Lower extremities: Non-tender , No swelling     Spine:   tender sacral sulic, and perisacral region. Negative occiput to wall, shober testing. Exam KEY:   Tender : T    Swelling: S,   Deformities: D,   Non-tender : NT  ,  No swelling: NS         RAPID 3  9/22/2021 --- RAPID 3:  0 + 0.5 + 0 = 0.5       Remission: <3  Low Disease Activity: <6  Moderate Disease Activity: >=6 and <=12  High Disease Activity: >12     LABS      Lab Results   Component Value Date    WBC 6.5 03/17/2021    HGB 15.3 03/17/2021    MCV 93.2 03/17/2021    MCHC 33.9 03/17/2021    RDW 12.9 03/28/2019     03/17/2021    NEUTROABS 3.90 03/28/2019    LYMPHSABS 2.0 03/17/2021    EOSABS 0.2 03/17/2021    BASOSABS 0.1 03/17/2021         Chemistry        Component Value Date/Time     03/17/2021 1110    K 4.2 03/17/2021 1110     03/17/2021 1110    CO2 28 03/17/2021 1110    BUN 10 03/17/2021 1110    CREATININE 0.8 03/17/2021 1110        Component Value Date/Time    CALCIUM 9.5 03/17/2021 1110    ALKPHOS 60 03/17/2021 1110    AST 24 03/17/2021 1110    ALT 35 03/17/2021 1110    BILITOT 1.3 (H) 03/17/2021 1110            Lab Results   Component Value Date    SEDRATE 8 03/17/2021    SEDRATE 5 08/24/2020    CRP < 0.30 03/17/2021    CRP 0.17 08/24/2020     Lab Results   Component Value Date    VITD25 28.2 10/04/2018     Lab Results   Component Value Date    ANASCRN Detected (A) 08/24/2020     Lab Results   Component Value Date    RF < 10 08/24/2020           RADIOLOGY / PROCEDURES:     ASSESSMENT/PLAN:     No diagnosis found. Polyarthralgia   HLA-B27: - sister w/ psoriasis, AM stiffness - no synovitis, intact ROm of spine and joint, h/o seborrhea. - discussed this can be seen in up to 9 % of caucasions with only a minority having an Axial spondylarthritis.    (hands, lower back) - inflammatory hx with prolonged morning stiffnes, relief with movement, no red symptoms . Exam without evidence of joint swelling or deformities and intact ROM. fmhx of psoriasis in sister is concerning and would monitoring intermittently at this time or f/u if symptoms worsne. -- Cont. Naproxen. Twice daily prn.   -- x-ray lumbar with SI joint sclerosis and not clearly delinated joint spaces. X-ray SI joint ordered given the inflamamtory back pain, + MANUEL-B27 and sister with PsA. No follow-ups on file. New Prescriptions    No medications on file       9/22/2021    Electronically signed by Joseph Chou DO on 9/22/21 at 10:26 AM EDT  Please contact the office if you have any questions or change of symptoms.

## 2021-10-04 ENCOUNTER — OFFICE VISIT (OUTPATIENT)
Dept: FAMILY MEDICINE CLINIC | Age: 46
End: 2021-10-04
Payer: COMMERCIAL

## 2021-10-04 VITALS
BODY MASS INDEX: 26 KG/M2 | OXYGEN SATURATION: 98 % | HEART RATE: 80 BPM | WEIGHT: 202.6 LBS | SYSTOLIC BLOOD PRESSURE: 124 MMHG | DIASTOLIC BLOOD PRESSURE: 78 MMHG | TEMPERATURE: 97.1 F | RESPIRATION RATE: 16 BRPM | HEIGHT: 74 IN

## 2021-10-04 DIAGNOSIS — Z12.11 SCREEN FOR COLON CANCER: ICD-10-CM

## 2021-10-04 DIAGNOSIS — Z00.00 ROUTINE PHYSICAL EXAMINATION: Primary | ICD-10-CM

## 2021-10-04 PROCEDURE — 90715 TDAP VACCINE 7 YRS/> IM: CPT | Performed by: NURSE PRACTITIONER

## 2021-10-04 PROCEDURE — 90471 IMMUNIZATION ADMIN: CPT | Performed by: NURSE PRACTITIONER

## 2021-10-04 PROCEDURE — 99396 PREV VISIT EST AGE 40-64: CPT | Performed by: NURSE PRACTITIONER

## 2021-10-04 SDOH — ECONOMIC STABILITY: FOOD INSECURITY: WITHIN THE PAST 12 MONTHS, YOU WORRIED THAT YOUR FOOD WOULD RUN OUT BEFORE YOU GOT MONEY TO BUY MORE.: NEVER TRUE

## 2021-10-04 SDOH — ECONOMIC STABILITY: FOOD INSECURITY: WITHIN THE PAST 12 MONTHS, THE FOOD YOU BOUGHT JUST DIDN'T LAST AND YOU DIDN'T HAVE MONEY TO GET MORE.: NEVER TRUE

## 2021-10-04 ASSESSMENT — SOCIAL DETERMINANTS OF HEALTH (SDOH): HOW HARD IS IT FOR YOU TO PAY FOR THE VERY BASICS LIKE FOOD, HOUSING, MEDICAL CARE, AND HEATING?: NOT HARD AT ALL

## 2021-10-04 NOTE — PROGRESS NOTES
Immunizations Administered     Name Date Dose Route    Tdap (Boostrix, Adacel) 10/4/2021 0.5 mL Intramuscular    Site: Deltoid- Right    Lot: [id number]    NDC: 17974-499-43          VIS GIVEN. CONSENT SIGNED  PATIENT TOLERATED WELL.

## 2021-10-04 NOTE — PROGRESS NOTES
Chief Complaint:   Clarence Yu is a 40 y.o. male who presents for complete physical examination    History of Present Illness:    Chief Complaint   Patient presents with    Annual Exam     insurance physical     Health Maintenance   Topic Date Due    COVID-19 Vaccine (1) Never done    Flu vaccine (1) Never done    Colon cancer screen colonoscopy  10/01/2021    Lipid screen  10/07/2025    DTaP/Tdap/Td vaccine (3 - Td or Tdap) 10/04/2031    HIV screen  Completed    Hepatitis A vaccine  Aged Out    Hepatitis B vaccine  Aged Out    Hib vaccine  Aged Out    Meningococcal (ACWY) vaccine  Aged Out    Pneumococcal 0-64 years Vaccine  Aged Out    Hepatitis C screen  Discontinued       Pt here for fu. Overall feeling very well.   Reviewed health maintenance     Patient Active Problem List   Diagnosis    Sleep disturbance    Fatigue    Vitamin D deficiency    Eczema    Reflux pharyngitis    Laryngitis from reflux of stomach acid    Pharyngoesophageal dysphagia    Gastroesophageal reflux disease with esophagitis and hemorrhage    Geographic tongue    Tonsillolith    Disorder of upper esophageal sphincter    Peptic stricture of esophagus    Pill dysphagia     Past Medical History:   Diagnosis Date    Allergic rhinitis     Broken nose     Broken ribs     Dermatitis     scalp    Headache     History of blood clots     Right leg    Osteoarthritis     stomach ulcer        Past Surgical History:   Procedure Laterality Date    CHOLECYSTECTOMY  9-9-14    CHOLECYSTECTOMY  09/2014    COSMETIC SURGERY  2013    nose reset    ENDOSCOPY, COLON, DIAGNOSTIC         Current Outpatient Medications   Medication Sig Dispense Refill    omeprazole (PRILOSEC) 40 MG delayed release capsule Take 1 capsule by mouth 2 times daily (before meals) 30 minutes before meals 180 capsule 3    Naproxen Sodium (ALEVE) 220 MG CAPS Take 1 capsule by mouth as needed for Pain      betamethasone valerate (LUXIQ) 0.12 % FOAM foam Apply topically as needed (eczema) Indications: for scalp 100 g 5    Loratadine-Pseudoephedrine (CLARITIN-D 12 HOUR PO) Take by mouth      fluticasone (FLONASE) 50 MCG/ACT nasal spray 2 sprays by Nasal route daily 1 Bottle 0    Multiple Vitamins-Minerals (MULTIVITAMIN ADULT PO) daily.  calcium carbonate (TUMS) 500 MG chewable tablet Take 1 tablet by mouth daily      acetaminophen (TYLENOL) 500 MG tablet Take 500 mg by mouth every 6 hours as needed for Pain. No current facility-administered medications for this visit. Allergies   Allergen Reactions    Augmentin [Amoxicillin-Pot Clavulanate]     Neosporin [Neomycin-Polymyxin-Gramicidin] Rash       Social History     Socioeconomic History    Marital status:      Spouse name: None    Number of children: None    Years of education: None    Highest education level: None   Occupational History    Occupation: maintence   Tobacco Use    Smoking status: Never Smoker    Smokeless tobacco: Never Used   Vaping Use    Vaping Use: Never used   Substance and Sexual Activity    Alcohol use: Yes     Comment: 4 times per year    Drug use: No    Sexual activity: Yes     Partners: Female     Comment: wife   Other Topics Concern    None   Social History Narrative    None     Social Determinants of Health     Financial Resource Strain: Low Risk     Difficulty of Paying Living Expenses: Not hard at all   Food Insecurity: No Food Insecurity    Worried About Running Out of Food in the Last Year: Never true    Felicita of Food in the Last Year: Never true   Transportation Needs:     Lack of Transportation (Medical):      Lack of Transportation (Non-Medical):    Physical Activity:     Days of Exercise per Week:     Minutes of Exercise per Session:    Stress:     Feeling of Stress :    Social Connections:     Frequency of Communication with Friends and Family:     Frequency of Social Gatherings with Friends and Family:     Attends Synagogue Services:     Active Member of Clubs or Organizations:     Attends Club or Organization Meetings:     Marital Status:    Intimate Partner Violence:     Fear of Current or Ex-Partner:     Emotionally Abused:     Physically Abused:     Sexually Abused:      Family History   Problem Relation Age of Onset    Cancer Mother         breast    Arthritis Mother     Diabetes Father     Psoriasis Sister     Arthritis Sister     Other Sister         brain tumor                            Review Of Systems  Skin: no abnormal pigmentation, rash, scaling, itching, masses, hair or nail changes  Eyes: no blurring, diplopia, or eye pain  Ears/Nose/Throat: no hearing loss, tinnitus, vertigo, nosebleed, nasal congestion, rhinorrhea, sore throat  Respiratory: no cough, pleuritic chest pain, dyspnea, or wheezing  Cardiovascular: no angina, FARNSWORTH, orthopnea, PND, palpitations, or claudication  Gastrointestinal: no nausea, vomiting, heartburn, diarrhea, constipation, bloating, or abdominal pain  Genitourinary: no urinary urgency, frequency, dysuria, nocturia, hesitancy, or incontinence  Musculoskeletal: no arthritis, arthralgia, myalgia, weakness, or morning stiffness  Neurologic: no paralysis, paresis, paresthesia, seizures, tremors, or headaches  Hematologic/Lymphatic/Immunologic: no anemia, abnormal bleeding/bruising, fever, chills, night sweats, swollen glands, or unexplained weight loss  Endocrine: no heat or cold intolerance and no polyphagia, polydipsia, or polyuria    PHYSICAL EXAMINATION:  /78 (Site: Left Upper Arm, Position: Sitting, Cuff Size: Large Adult)   Pulse 80   Temp 97.1 °F (36.2 °C) (Temporal)   Resp 16   Ht 6' 2.02\" (1.88 m)   Wt 202 lb 9.6 oz (91.9 kg)   SpO2 98%   BMI 26.00 kg/m²   General appearance: healthy, alert, no distress  Skin: Skin color, texture, turgor normal. No rashes or lesions. No induration or tightening palpated. Head: Normocephalic.  No masses, lesions, tenderness or abnormalities  Eyes: conjunctivae/corneas clear. PERRL, EOM's intact. Fundi are normal, no papilledema, hemorrhages or exudates. No AV crossing changes are noted. Ears: External ears normal. Canals clear. TM's clear bilaterally. Hearing normal to finger rub. Nose/Sinuses: Nares normal. Septum midline. Mucosa normal. No drainage or sinus tenderness. Oropharynx: Lips, mucosa, and tongue normal. Teeth and gums normal. Oropharynx clear with no exudate seen. Neck: Neck supple, and symmetric. No adenopathy. Thyroid symmetric, normal size, without nodule. Trachea is midline. Back: Back symmetric, no curvature. ROM normal. No CVA tenderness. Lungs: Good diaphragmatic excursion. Lungs clear to auscultation bilaterally. No retractions or use of accessory muscles. No tactile fremitus. Normal chest percussion. Heart: PMI is not displaced, and no thrill noted. Regular rate and rhythm, with no rub, murmur or gallop noted. Abdomen: Abdomen soft, non-tender. BS normal. No masses, organomegaly. No hernia noted. Extremities: Extremities normal. No deformities, edema, or skin discoloration. No cyanosis or clubbing noted to the nails. Lymph: No lymphadenopathy of the neck or supraclavicular regions. Musculoskeletal: Spine ROM normal. Muscular strength intact. Peripheral pulses: radial=4/4, femoral=4/4, dorsalis pedis=4/4,  Neuro: Cranial nerves intact, Gait normal. Reflexes normal and symmetric, with no pathologic reflex noted. No focal weakness. Normal sensation to light touch.     No components found for: CHLPL  Lab Results   Component Value Date    TRIG 139 10/07/2020    TRIG 131 10/07/2019    TRIG 202 (H) 10/04/2018     Lab Results   Component Value Date    HDL 46 10/07/2020    HDL 38 (L) 10/07/2019    HDL 38 (L) 10/04/2018     Lab Results   Component Value Date    LDLCALC 82 10/07/2020    [address] 04/18/2016     No results found for: LABVLDL  Lab Results   Component Value Date    PSA 0.65 08/24/2020    PSA 0.74 11/06/2018         ASSESSMENT:     Diagnosis Orders   1. Routine physical examination  Tdap (age 6y and older) IM (BOOSTRIX)    Lipid Panel    Lipid Panel   2. Screen for colon cancer  [facility]HE[facility] - Christina Batista MD, Gastroenterology, [facility]         Plan:   See orders and medications filed with this encounter. Advised on preventative health maintenance guidelines and schedules to be completed. reviewed appropriate vaccines. Pt refused flu vaccine. Orders per enc. To call with any questions or concerns.

## 2021-10-05 LAB
CHOLESTEROL, TOTAL: 155 MG/DL (ref 100–199)
HDLC SERPL-MCNC: 44 MG/DL
LDL CHOLESTEROL CALCULATED: 82 MG/DL
TRIGL SERPL-MCNC: 145 MG/DL (ref 0–199)

## 2021-10-13 ENCOUNTER — OFFICE VISIT (OUTPATIENT)
Dept: ENT CLINIC | Age: 46
End: 2021-10-13
Payer: COMMERCIAL

## 2021-10-13 VITALS
DIASTOLIC BLOOD PRESSURE: 72 MMHG | BODY MASS INDEX: 25.93 KG/M2 | OXYGEN SATURATION: 97 % | WEIGHT: 202 LBS | RESPIRATION RATE: 16 BRPM | SYSTOLIC BLOOD PRESSURE: 122 MMHG | TEMPERATURE: 98.1 F | HEIGHT: 74 IN | HEART RATE: 93 BPM

## 2021-10-13 DIAGNOSIS — J02.9 REFLUX PHARYNGITIS: ICD-10-CM

## 2021-10-13 DIAGNOSIS — J30.9 ALLERGIC RHINITIS, UNSPECIFIED SEASONALITY, UNSPECIFIED TRIGGER: ICD-10-CM

## 2021-10-13 DIAGNOSIS — R13.10 PILL DYSPHAGIA: Primary | ICD-10-CM

## 2021-10-13 PROCEDURE — 92511 NASOPHARYNGOSCOPY: CPT | Performed by: PHYSICIAN ASSISTANT

## 2021-10-13 RX ORDER — OMEPRAZOLE 40 MG/1
40 CAPSULE, DELAYED RELEASE ORAL
Qty: 180 CAPSULE | Refills: 3 | Status: SHIPPED | OUTPATIENT
Start: 2021-10-13 | End: 2022-10-06 | Stop reason: SDUPTHER

## 2021-10-13 NOTE — PROGRESS NOTES
[address] EAR, NOSE AND THROAT  [address] [place] [address] [place] [address]  Dept: [phone]  Dept Fax: [phone]  Loc: [phone]    Jorgito Valadez is a 40 y.o. male who was referred by No ref. provider found for:  No chief complaint on file. Nickie Remedies HPI:     Current visit HPI- The patient presents for follow up regarding dysphagia. He reports overall improvement in his swallowing since he was last seen. He states that he still has episodes where pills seems to get stuck,but this is about once a month. He reports this is quite improved cmopared to previously. He states that when the pill gets stuck he will cough and the pill with come back up. It is typically with larger pills, such as his multivitamin. He denies any odynophagia, sore throat, hemoptysis, unintentional weight loss, fevers, chills. He reports fairly significant improvement in his reflux symptoms as well. He does not routinely take his reflux medications, but will take it if he eats foods that he knows cause his reflux. He does report a history of environmental allergies and can tell when he misses a dose of his medication. He states he mostly experiences significant clear rhinorrhea if he misses a dose. He denies any other symptoms or concerns at this time. Initial ENT HPI 10/5/20- Jorgito Valadez is a 41 y.o. male complaining of a mass underneath his tongue as well as, and more importantly chronic phlegm and difficulty swallowing for at least the last 6 months. He states that pills and solid foods have begun to get stuck in that region and slowly passed into his esophagus. In addition he is noticing that his voice is cutting in and out more frequently. He gets phlegm in his throat that if he clears his voice will improve. He is also having regular episodes of \"indigestion\" and heartburn despite the fact that he is still on some form of acid suppression; currently Pepcid.   He has had GI evaluations in the past 1 of which found him to have severe chronic gastritis with ulcers as well as pyloric stenosis. He had his pylorus stretched and he feels like his symptoms improved. In addition he has blood in his sputum with some regularity. He states that it is always a tiny amount like flecks of blood or streaking of blood and not blood clots. He is a never smoker. He has noticed that the Claritin-D that he was started on for his phlegm has improved some of his symptoms but did not change his dysphagia. He is recently been diagnosed with some form of rheumatological problem in an effort to wear a work-up pain and stiffness of his hands that include a cramping process that he experiences at work and usually responds to letting go of the instrument is working on and shaking of his hand. He denies any focal joint pain and has had no joints that of themselves become swollen and tender. Not only has he never smoked but he has never dipped tobacco or V8. He has no family history of cancer. He is unaware of having a hiatal hernia or not. He has been off of proton pump inhibitors for the last 6 months (beginning just about the start of this problem) because he broke 2 ribs in rapid succession both associated with blunt trauma to his chest but read that proton pump inhibitor therapy can be associated with leaching of bone calcium so he stopped the medication. He had been on PPIs and tolerating them well for a year prior to this. In an effort to control his own reflux he decreased his soda intake which used to irritate his stomach and cause regurgitation symptoms to a very distinct degree. He also decreased his coffee down to 1 cup/day. Subjective:      REVIEW OF SYSTEMS:    A complete multi-organ review of systems was performed using a new patient questionnaire, and reviewed by me.   ENT:  negative except as noted in HPI  CONSTITUTIONAL:  negative except as noted in HPI  EYES:  negative except as noted in HPI  RESPIRATORY:  negative except as noted in HPI  CARDIOVASCULAR:  negative except as noted in HPI  GASTROINTESTINAL:  negative except as noted in HPI  GENITOURINARY:  negative except as noted in HPI  MUSCULOSKELETAL:  negative except as noted in HPI  SKIN:  negative except as noted in HPI  ENDOCRINE/METABOLIC: negative except as noted in HPI  HEMATOLOGIC/LYMPHATIC:  negative except as noted in HPI  ALLERGY/IMMUN: negative except as noted in HPI  NEUROLOGICAL:  negative except as noted in HPI  BEHAVIOR/PSYCH:  negative except as noted in HPI    Past Medical History:  Past Medical History:   Diagnosis Date    Allergic rhinitis     Broken nose     Broken ribs     Dermatitis     scalp    Headache     History of blood clots     Right leg    Osteoarthritis     stomach ulcer        Social History:    TOBACCO:   reports that he has never smoked. He has never used smokeless tobacco.  ETOH:   reports current alcohol use. DRUGS:   reports no history of drug use. Family History:       Problem Relation Age of Onset   Robertha Rumps Cancer Mother         breast    Arthritis Mother    Robertha Rumps Diabetes Father    Robertha Rumps Psoriasis Sister     Arthritis Sister     Other Sister         brain tumor       Surgical History:  Past Surgical History:   Procedure Laterality Date    CHOLECYSTECTOMY  9-9-14    CHOLECYSTECTOMY  09/2014    COSMETIC SURGERY  2013    nose reset    ENDOSCOPY, COLON, DIAGNOSTIC          Objective: This is a 40 y.o. male. Patient is alert and oriented to person, place and time. Patient appears well developed, well nourished. Mood is happy with normal affect. Not obviously hearing impaired. No abnormality in speech noted. /72 (Site: Left Upper Arm, Position: Sitting)   Pulse 93   Temp 98.1 °F (36.7 °C) (Infrared)   Resp 16   Ht 6' 2\" (1.88 m)   Wt 202 lb (91.6 kg)   SpO2 97%   BMI 25.94 kg/m²     Head:   Normocephalic, atraumatic. No obvious masses or lesions noted.     Nose: External nose: Appears midline. No obvious deformity or masses. Septum:  normal. No septal hematoma. No perforation. Mucosa:  clear  Turbinates: normal and pink            Discharge:  clear    Mouth/Throat:  Lips, tongue and oral cavity: Normal. No masses or lesions noted   Dentition: good, no malocclusion  Oral mucosa: moist  Tonsils: present, not acutely enlarged and no erythema or exudates  Oropharynx: normal-appearing mucosa  Hard and soft palates: symmetrical and intact. Salivary glands: not enlarged and no tenderness to palpation. Uvula: midline, no obvious lesions   Gag reflex is present. Neck: Trachea midline. Thyroid not enlarged. No palpable neck masses or tenderness. Lymphatic: No palpable cervical lymphadenopathy noted. Eyes: KISHA, EOM intact. Conjunctiva moist without discharge. Lungs: Normal effort of breathing, not obviously distressed. Neuro: Cranial nerves II-XII grossly intact. Extremities: No clubbing, edema, or cyanosis noted. Procedure: Fiberoptic nasopharyngoscopy  The nose was sprayed for 4% topical lidocaine and Afrin. Flexible nasopharyngoscope was advanced through the nose to further assess pharynx and larynx. Base of tongue is hypertrophic and epiglottis followed contour of the tongue base. The patient has 2 deep cul-de-sacs in the vallecula which appears stable compared to previously. No mucosal lesions/ulcerations or masses concerning for neoplasia. Overall the patient tolerated the procedure well without immediate evidence of complication. Assessment/Plan:     Diagnosis Orders   1. Pill dysphagia  TX NASOPHARYNGOSCOPY   2. Reflux pharyngitis     3. Allergic rhinitis, unspecified seasonality, unspecified trigger         The patient is a 40 y.o. male that presents for follow-up regarding pill dysphagia. No lesions or other findings concerning for neoplasia at this time. Patient has persistent cul-de-sacs in the vallecula were seen previously by Dr. Nupur Calhoun.   I informed the patient that I would review the pictures that we obtained during fiberoptic nasopharyngoscopy today and ensure Dr. Alfa eSars sees no concerning pathology. Our office will contact the patient to coordinate follow-up once Dr. Alfa Sears and I have reviewed the images. If no concerning findings, patient will likely be scheduled for approximately 6-month follow-up. I encouraged patient to continue his acid reflux regimen in the meantime. Patient is encouraged to contact our office sooner if he were to develop any concerning symptoms, such as: hemoptysis, unintentional weight loss, worsening dysphagia, changes in voice, or other symptoms/concerns. He expressed understanding the plan and thanked me.     (Please note that portions of this note may have been completed with a voice recognition program.  Efforts were made to edit the dictation but occasionally words are mis-transcribed.)    Electronically signed by LAI Joseph on 11/5/2021 at 3:17 PM

## 2021-11-05 ENCOUNTER — TELEPHONE (OUTPATIENT)
Dept: ENT CLINIC | Age: 46
End: 2021-11-05

## 2021-11-05 NOTE — TELEPHONE ENCOUNTER
Please call the patient and let him know that Dr. Carolann Salas and I had reviewed the images from the scope completed in the office. No concerning findings were noted. We recommend patient following up in approximately 6 months for repeat examination, but he should contact us sooner with new/worsening symptoms or other concerns in the meantime.

## 2021-11-11 NOTE — TELEPHONE ENCOUNTER
Patient returned call but got our clinical voicemail. Patient stated he was returning a call from Dr Anaid Tamayo. Will check with Dr Anaid Tamayo tomorrow if he called the patient.

## 2022-01-13 ENCOUNTER — E-VISIT (OUTPATIENT)
Dept: PRIMARY CARE CLINIC | Age: 47
End: 2022-01-13
Payer: COMMERCIAL

## 2022-01-13 DIAGNOSIS — J01.90 ACUTE NON-RECURRENT SINUSITIS, UNSPECIFIED LOCATION: Primary | ICD-10-CM

## 2022-01-13 DIAGNOSIS — R05.9 COUGH: ICD-10-CM

## 2022-01-13 PROCEDURE — 99422 OL DIG E/M SVC 11-20 MIN: CPT | Performed by: NURSE PRACTITIONER

## 2022-01-13 RX ORDER — AZITHROMYCIN 250 MG/1
TABLET, FILM COATED ORAL
Qty: 1 PACKET | Refills: 0 | Status: SHIPPED | OUTPATIENT
Start: 2022-01-13 | End: 2022-01-23

## 2022-01-13 RX ORDER — METHYLPREDNISOLONE 4 MG/1
TABLET ORAL
Qty: 1 KIT | Refills: 0 | Status: SHIPPED | OUTPATIENT
Start: 2022-01-13 | End: 2022-01-19

## 2022-01-13 ASSESSMENT — LIFESTYLE VARIABLES: SMOKING_STATUS: NO, I'VE NEVER SMOKED

## 2022-01-13 NOTE — PROGRESS NOTES
Reviewed questionnaire  Reviewed previous encounters, medications, allergies and history     Dx sinusitis  Cough     Plan  rx for zpack  Rx for medrol dose pack      1. Water: Drink 1/2 of body weight (lb) in ounces,  Up to 90 Ounces of water per day. This will loosen mucus in the head and chest & improve the weak feeling of dehydration, allow the body to get germ fighting resources to the infection. Half can be juice or sugar free powerade or garorate. Don't count drinks with caffeine or carbonation. Infants can have Pedialyte liquid or freezer pops. Avoid salt if you have high Blood Pressure, swelling in the feet or ankles or have heart problems. 2. Humidity: Humidify the air to 35-50% ( or until the windows fog over slightly).  Summer use of an air conditioner turned down too far and can result in dry air. Can use a humidifier, vaporizer, boil water on the stove or put a coffee can full of water on the heater vents. This will loosen mucus from infections and allergies. 3. Sleep: Get 8-10 hours a night and rest during the evening after work or school. If you have trouble sleeping, adults can take Melatonin 5mg up to 2 tabs at bedtime ( not for children or pregnant women). If Mono is suspected then sleep during 9PM to 9AM time span (if possible.)  4. Cough: Take cough medicines with Guaifenesin ( to loosen chest or head congestion) and Dextromethorphan ( to decrease excess cough). Robitussin D.M. Syrup every 4-6 hrs or Mucinex D. M. pills twice a day. Use the pediatric formulations for children over 6 months making sure they are alcohol & sugar free for children, pregnant women, and diabetics. 5. Pain And Fevers: Take Acetaminophen ( Tylenol) for fevers, aches, and headaches. 2-500 mg every 8 hours for adults. Appropriate doses at bedtime for children may help them sleep better. Ibuprofen may be used if not pregnant, but should be given with food to avoid nausea.  Avoid Ibuprofen if you have high blood pressure, CHF, or kidney problems. 6.Gargle: (DAY ONE OF SYMPTOMS) Gargle in the back of the throat with the head tilted back and to the sides with a strong mouthwash  ( Listerine or Scope) after meals and at bedtime at least 4 -5 times a day. This helps kill bacteria and viruses in the back of the throat and will shorten the duration and decrease the severity of your symptoms: sore throat, cough, ear popping,/ear pain, and possibly dizziness. 7. Smoking: Avoid smoking or exposure to second hand smoke. 8. Zinc: (DAY ONE OF SYMPTOMS)  Zinc lozenges such as Cold Kirt (available most stores), or Basic ([employer] brand) will help shorten the duration and lessen symptoms such as sore throat, cough, nasal congestion, runny nose, and post nasal drip. Use 1 lozenge every 2-4 hours ( after meals if stomach is sensitive). Children can use 10-15 mg or less 3-4 times a day or Zinc lollypops. In pregnancy limit to 50-60 mg a day for 7 days as prenatals have Zinc also.   With diarrhea use zinc pills 50 mg 1/2 to 1 pill 2x/day. 9. Vitamins: Vitamin C 500 mg with breakfast and dinner. Children and pregnant women should drink citrus juices. This speeds healing and strengthens immune system. 10. Chest Symptoms: Vicks Vapor rub to the chest at bedtime. 11. Decongestants: Avoid all decongestants if you have high blood pressure. Safe to take if you do not have high blood pressure. Try all of the above starting with day 1 of symptoms. If Strep throat symptoms appear call to be seen in the office as soon as possible and don't gargle on that day. Newborns, infants, or anyone with earaches or influenza may need to be seen quickly. Adults with fevers over 103 degrees or shortness of breath should call the office immediately.   12. Nasal saline spray or rinse. There are many different ways to do this OTC.     I hope that you feel better.  If you do not feel better after treatment, please f/u with pcp.     Time spent 11-20 minutes

## 2022-01-14 NOTE — PATIENT INSTRUCTIONS
Patient Education        Saline Nasal Washes: Care Instructions  Your Care Instructions     Saline nasal washes help keep the nasal passages open by washing out thick or dried mucus. This simple remedy can help relieve symptoms of allergies, sinusitis, and colds. It also can make the nose feel more comfortable by keeping the mucous membranes moist. You may notice a little burning sensation in your nose the first few times you use the solution, but this usually gets better in a few days. Follow-up care is a key part of your treatment and safety. Be sure to make and go to all appointments, and call your doctor if you are having problems. It's also a good idea to know your test results and keep a list of the medicines you take. How can you care for yourself at home? · You can buy premixed saline solution in a squeeze bottle or other sinus rinse products at a drugstore. Read and follow the instructions on the label. · You also can make your own saline solution by adding 1 teaspoon of salt and 1 teaspoon of baking soda to 2 cups of distilled water. · If you use a homemade solution, pour a small amount into a clean bowl. Using a rubber bulb syringe, squeeze the syringe and place the tip in the salt water. Pull a small amount of the salt water into the syringe by relaxing your hand. · Sit down with your head tilted slightly back. Do not lie down. Put the tip of the bulb syringe or the squeeze bottle a little way into one of your nostrils. Gently drip or squirt a few drops into the nostril. Repeat with the other nostril. Some sneezing and gagging are normal at first.  · Gently blow your nose. · Wipe the syringe or bottle tip clean after each use. · Repeat this 2 or 3 times a day. · Use nasal washes gently if you have nosebleeds often. When should you call for help?   Watch closely for changes in your health, and be sure to contact your doctor if:    · You often get nosebleeds.     · You have problems doing the nasal washes. Where can you learn more? Go to [email or web address] and sign in to your [employer] account. Enter [phone] in the [facility] box to learn more about \"Saline Nasal Washes: Care Instructions. \"     If you do not have an account, please click on the \"Sign Up Now\" link. Current as of: September 8, 2021               Content Version: 13.1  © 2006-2021 Healthwise, [facility]. Care instructions adapted under license by [facility] ([facility]). If you have questions about a medical condition or this instruction, always ask your healthcare professional. [address] any warranty or liability for your use of this information.

## 2022-08-08 ENCOUNTER — E-VISIT (OUTPATIENT)
Dept: FAMILY MEDICINE CLINIC | Age: 47
End: 2022-08-08
Payer: COMMERCIAL

## 2022-08-08 DIAGNOSIS — B96.89 ACUTE BACTERIAL SINUSITIS: Primary | ICD-10-CM

## 2022-08-08 DIAGNOSIS — J01.90 ACUTE BACTERIAL SINUSITIS: Primary | ICD-10-CM

## 2022-08-08 PROCEDURE — 99421 OL DIG E/M SVC 5-10 MIN: CPT | Performed by: NURSE PRACTITIONER

## 2022-08-08 RX ORDER — METHYLPREDNISOLONE 4 MG/1
TABLET ORAL
Qty: 1 KIT | Refills: 0 | Status: SHIPPED | OUTPATIENT
Start: 2022-08-08 | End: 2022-08-14

## 2022-08-08 RX ORDER — LEVOFLOXACIN 500 MG/1
500 TABLET, FILM COATED ORAL DAILY
Qty: 7 TABLET | Refills: 0 | Status: SHIPPED | OUTPATIENT
Start: 2022-08-08 | End: 2022-08-15

## 2022-08-08 ASSESSMENT — LIFESTYLE VARIABLES: SMOKING_STATUS: NO, I'VE NEVER SMOKED

## 2022-10-06 ENCOUNTER — OFFICE VISIT (OUTPATIENT)
Dept: FAMILY MEDICINE CLINIC | Age: 47
End: 2022-10-06
Payer: COMMERCIAL

## 2022-10-06 ENCOUNTER — HOSPITAL ENCOUNTER (OUTPATIENT)
Age: 47
Discharge: HOME | End: 2022-10-06
Payer: COMMERCIAL

## 2022-10-06 VITALS
TEMPERATURE: 98 F | BODY MASS INDEX: 25.94 KG/M2 | WEIGHT: 202 LBS | RESPIRATION RATE: 14 BRPM | HEART RATE: 84 BPM | DIASTOLIC BLOOD PRESSURE: 74 MMHG | SYSTOLIC BLOOD PRESSURE: 120 MMHG

## 2022-10-06 DIAGNOSIS — Z12.5 SCREENING FOR PROSTATE CANCER: ICD-10-CM

## 2022-10-06 DIAGNOSIS — Z00.00 ROUTINE PHYSICAL EXAMINATION: Primary | ICD-10-CM

## 2022-10-06 DIAGNOSIS — M77.11 LATERAL EPICONDYLITIS OF RIGHT ELBOW: ICD-10-CM

## 2022-10-06 DIAGNOSIS — Z00.00 ROUTINE PHYSICAL EXAMINATION: ICD-10-CM

## 2022-10-06 DIAGNOSIS — Z12.11 SCREEN FOR COLON CANCER: ICD-10-CM

## 2022-10-06 LAB
BASOPHILS # BLD: 1.9 %
BASOPHILS ABSOLUTE: 0.2 THOU/MM3 (ref 0–0.1)
EOSINOPHIL # BLD: 3.2 %
EOSINOPHILS ABSOLUTE: 0.3 THOU/MM3 (ref 0–0.4)
ERYTHROCYTE [DISTWIDTH] IN BLOOD BY AUTOMATED COUNT: 12.3 % (ref 11.5–14.5)
ERYTHROCYTE [DISTWIDTH] IN BLOOD BY AUTOMATED COUNT: 42.5 FL (ref 35–45)
HCT VFR BLD CALC: 42.8 % (ref 42–52)
HEMOGLOBIN: 14.4 GM/DL (ref 14–18)
IMMATURE GRANS (ABS): 0.01 THOU/MM3 (ref 0–0.07)
IMMATURE GRANULOCYTES: 0.1 %
LYMPHOCYTES # BLD: 30.7 %
LYMPHOCYTES ABSOLUTE: 2.5 THOU/MM3 (ref 1–4.8)
MCH RBC QN AUTO: 32.3 PG (ref 26–33)
MCHC RBC AUTO-ENTMCNC: 33.6 GM/DL (ref 32.2–35.5)
MCV RBC AUTO: 96 FL (ref 80–94)
MONOCYTES # BLD: 8.6 %
MONOCYTES ABSOLUTE: 0.7 THOU/MM3 (ref 0.4–1.3)
NUCLEATED RED BLOOD CELLS: 0 /100 WBC
PLATELET # BLD: 311 THOU/MM3 (ref 130–400)
PMV BLD AUTO: 10.4 FL (ref 9.4–12.4)
RBC # BLD: 4.46 MILL/MM3 (ref 4.7–6.1)
SEG NEUTROPHILS: 55.5 %
SEGMENTED NEUTROPHILS ABSOLUTE COUNT: 4.4 THOU/MM3 (ref 1.8–7.7)
WBC # BLD: 8 THOU/MM3 (ref 4.8–10.8)

## 2022-10-06 PROCEDURE — 85025 COMPLETE CBC W/AUTO DIFF WBC: CPT

## 2022-10-06 PROCEDURE — 80061 LIPID PANEL: CPT

## 2022-10-06 PROCEDURE — 99396 PREV VISIT EST AGE 40-64: CPT | Performed by: NURSE PRACTITIONER

## 2022-10-06 PROCEDURE — 36415 COLL VENOUS BLD VENIPUNCTURE: CPT

## 2022-10-06 PROCEDURE — 80053 COMPREHEN METABOLIC PANEL: CPT

## 2022-10-06 RX ORDER — OMEPRAZOLE 40 MG/1
40 CAPSULE, DELAYED RELEASE ORAL
Qty: 180 CAPSULE | Refills: 3 | Status: SHIPPED | OUTPATIENT
Start: 2022-10-06

## 2022-10-06 SDOH — ECONOMIC STABILITY: FOOD INSECURITY: WITHIN THE PAST 12 MONTHS, YOU WORRIED THAT YOUR FOOD WOULD RUN OUT BEFORE YOU GOT MONEY TO BUY MORE.: NEVER TRUE

## 2022-10-06 SDOH — ECONOMIC STABILITY: FOOD INSECURITY: WITHIN THE PAST 12 MONTHS, THE FOOD YOU BOUGHT JUST DIDN'T LAST AND YOU DIDN'T HAVE MONEY TO GET MORE.: NEVER TRUE

## 2022-10-06 ASSESSMENT — SOCIAL DETERMINANTS OF HEALTH (SDOH): HOW HARD IS IT FOR YOU TO PAY FOR THE VERY BASICS LIKE FOOD, HOUSING, MEDICAL CARE, AND HEATING?: NOT HARD AT ALL

## 2022-10-06 NOTE — PROGRESS NOTES
Chief Complaint:   Barbi Mckenzie is a 56 y.o. male who presents for complete physical examination    History of Present Illness:    Chief Complaint   Patient presents with    Annual Exam    Elbow Pain     Right elbow      Health Maintenance   Topic Date Due    COVID-19 Vaccine (1) Never done    Colorectal Cancer Screen  Never done    Depression Screen  07/01/2022    Flu vaccine (1) Never done    Lipids  10/04/2026    DTaP/Tdap/Td vaccine (3 - Td or Tdap) 10/04/2031    HIV screen  Completed    Hepatitis A vaccine  Aged Out    Hepatitis B vaccine  Aged Out    Hib vaccine  Aged Out    Meningococcal (ACWY) vaccine  Aged Out    Pneumococcal 0-64 years Vaccine  Aged Out    Hepatitis C screen  Discontinued     Pt here for annual exam  has been having right tennis elbow pain        Patient Active Problem List   Diagnosis    Sleep disturbance    Fatigue    Vitamin D deficiency    Eczema    Reflux pharyngitis    Laryngitis from reflux of stomach acid    Pharyngoesophageal dysphagia    Gastroesophageal reflux disease with esophagitis and hemorrhage    Geographic tongue    Tonsillolith    Disorder of upper esophageal sphincter    Peptic stricture of esophagus    Pill dysphagia     Past Medical History:   Diagnosis Date    Allergic rhinitis     Broken nose     Broken ribs     Dermatitis     scalp    Headache     History of blood clots     Right leg    Osteoarthritis     stomach ulcer        Past Surgical History:   Procedure Laterality Date    CHOLECYSTECTOMY  9-9-14    CHOLECYSTECTOMY  09/2014    COSMETIC SURGERY  2013    nose reset    ENDOSCOPY, COLON, DIAGNOSTIC         Current Outpatient Medications   Medication Sig Dispense Refill    omeprazole (PRILOSEC) 40 MG delayed release capsule Take 1 capsule by mouth 2 times daily (before meals) 30 minutes before meals 180 capsule 3    Naproxen Sodium 220 MG CAPS Take 1 capsule by mouth as needed for Pain      betamethasone valerate (LUXIQ) 0.12 % FOAM foam Apply topically as needed (eczema) Indications: for scalp 100 g 5    Loratadine-Pseudoephedrine (CLARITIN-D 12 HOUR PO) Take by mouth      fluticasone (FLONASE) 50 MCG/ACT nasal spray 2 sprays by Nasal route daily 1 Bottle 0    Multiple Vitamins-Minerals (MULTIVITAMIN ADULT PO) daily. calcium carbonate (TUMS) 500 MG chewable tablet Take 1 tablet by mouth daily      acetaminophen (TYLENOL) 500 MG tablet Take 500 mg by mouth every 6 hours as needed for Pain. No current facility-administered medications for this visit.      Allergies   Allergen Reactions    Augmentin [Amoxicillin-Pot Clavulanate]     Neosporin [Neomycin-Polymyxin-Gramicidin] Rash       Social History     Socioeconomic History    Marital status:    Occupational History    Occupation: [employer]   Tobacco Use    Smoking status: Never    Smokeless tobacco: Never   Vaping Use    Vaping Use: Never used   Substance and Sexual Activity    Alcohol use: Yes     Comment: 4 times per year    Drug use: No    Sexual activity: Yes     Partners: Female     Comment: wife     Social Determinants of Health     Financial Resource Strain: Low Risk     Difficulty of Paying Living Expenses: Not hard at all   Food Insecurity: No Food Insecurity    Worried About Running Out of Food in the Last Year: Never true    Ran Out of Food in the Last Year: Never true     Family History   Problem Relation Age of Onset    Cancer Mother         breast    Arthritis Mother     Diabetes Father     Psoriasis Sister     Arthritis Sister     Other Sister         brain tumor                            Review Of Systems  Skin: no abnormal pigmentation, rash, scaling, itching, masses, hair or nail changes  Eyes: no blurring, diplopia, or eye pain  Ears/Nose/Throat: no hearing loss, tinnitus, vertigo, nosebleed, nasal congestion, rhinorrhea, sore throat  Respiratory: no cough, pleuritic chest pain, dyspnea, or wheezing  Cardiovascular: no angina, FARNSWORTH, orthopnea, PND, palpitations, or claudication  Gastrointestinal: no nausea, vomiting, heartburn, diarrhea, constipation, bloating, or abdominal pain  Genitourinary: no urinary urgency, frequency, dysuria, nocturia, hesitancy, or incontinence  Musculoskeletal: tennis elbow pain  Neurologic: no paralysis, paresis, paresthesia, seizures, tremors, or headaches  Hematologic/Lymphatic/Immunologic: no anemia, abnormal bleeding/bruising, fever, chills, night sweats, swollen glands, or unexplained weight loss  Endocrine: no heat or cold intolerance and no polyphagia, polydipsia, or polyuria    PHYSICAL EXAMINATION:  /74 (Site: Left Upper Arm, Position: Sitting, Cuff Size: Medium Adult)   Pulse 84   Temp 98 °F (36.7 °C) (Temporal)   Resp 14   Wt 202 lb (91.6 kg)   BMI 25.94 kg/m²   General appearance: healthy, alert, no distress  Skin: Skin color, texture, turgor normal. No rashes or lesions. No induration or tightening palpated. Head: Normocephalic. No masses, lesions, tenderness or abnormalities  Eyes: conjunctivae/corneas clear. PERRL, EOM's intact. Fundi are normal, no papilledema, hemorrhages or exudates. No AV crossing changes are noted. Ears: External ears normal. Canals clear. TM's clear bilaterally. Hearing normal to finger rub. Nose/Sinuses: Nares normal. Septum midline. Mucosa normal. No drainage or sinus tenderness. Oropharynx: Lips, mucosa, and tongue normal. Teeth and gums normal. Oropharynx clear with no exudate seen. Neck: Neck supple, and symmetric. No adenopathy. Thyroid symmetric, normal size, without nodule. Trachea is midline. Back: Back symmetric, no curvature. ROM normal. No CVA tenderness. Lungs: Good diaphragmatic excursion. Lungs clear to auscultation bilaterally. No retractions or use of accessory muscles. No tactile fremitus. Normal chest percussion. Heart: PMI is not displaced, and no thrill noted. Regular rate and rhythm, with no rub, murmur or gallop noted. Abdomen: Abdomen soft, non-tender.  BS normal. No masses, organomegaly. No hernia noted. Extremities: Extremities normal. No deformities, edema, or skin discoloration. No cyanosis or clubbing noted to the nails. Lymph: No lymphadenopathy of the neck or supraclavicular regions. Musculoskeletal: tender to right lateral epicondyle  Peripheral pulses: radial=4/4, femoral=4/4, dorsalis pedis=4/4,  Neuro: Cranial nerves intact, Gait normal. Reflexes normal and symmetric, with no pathologic reflex noted. No focal weakness. Normal sensation to light touch. No components found for: CHLPL  Lab Results   Component Value Date    TRIG 145 10/04/2021    TRIG 139 10/07/2020    TRIG 131 10/07/2019     Lab Results   Component Value Date    HDL 44 10/04/2021    HDL 46 10/07/2020    HDL 38 (L) 10/07/2019     Lab Results   Component Value Date    LDLCALC 82 10/04/2021    LDLCALC 82 10/07/2020    LDLCALC 77 04/18/2016     No results found for: LABVLDL  Lab Results   Component Value Date    PSA 0.65 08/24/2020    PSA 0.74 11/06/2018         ASSESSMENT:     Diagnosis Orders   1. Routine physical examination  CBC with Auto Differential    Comprehensive Metabolic Panel    Lipid Panel      2. Lateral epicondylitis of right elbow        3. Screen for colon cancer  NAHEED - Mortimer Bruins, MD, Gastroenterology, [facility] [address]      4. Screening for prostate cancer  PSA Screening            Plan:   See orders and medications filed with this encounter. Advised on preventative health maintenance guidelines and schedules to be completed. reviewed appropriate vaccines. Pt refused none. Orders per enc. To call with any questions or concerns.

## 2022-10-07 LAB
ALBUMIN SERPL-MCNC: 4.4 G/DL (ref 3.5–5.1)
ALP BLD-CCNC: 61 U/L (ref 38–126)
ALT SERPL-CCNC: 29 U/L (ref 11–66)
ANION GAP SERPL CALCULATED.3IONS-SCNC: 12 MEQ/L (ref 8–16)
AST SERPL-CCNC: 25 U/L (ref 5–40)
BILIRUB SERPL-MCNC: 1.9 MG/DL (ref 0.3–1.2)
BUN BLDV-MCNC: 13 MG/DL (ref 7–22)
CALCIUM SERPL-MCNC: 9.5 MG/DL (ref 8.5–10.5)
CHLORIDE BLD-SCNC: 103 MEQ/L (ref 98–111)
CHOLESTEROL, TOTAL: 149 MG/DL (ref 100–199)
CO2: 25 MEQ/L (ref 23–33)
CREAT SERPL-MCNC: 0.8 MG/DL (ref 0.4–1.2)
GFR SERPL CREATININE-BSD FRML MDRD: > 90 ML/MIN/1.73M2
GLUCOSE BLD-MCNC: 90 MG/DL (ref 70–108)
HDLC SERPL-MCNC: 37 MG/DL
LDL CHOLESTEROL CALCULATED: 85 MG/DL
POTASSIUM SERPL-SCNC: 4.6 MEQ/L (ref 3.5–5.2)
SODIUM BLD-SCNC: 140 MEQ/L (ref 135–145)
TOTAL PROTEIN: 7.3 G/DL (ref 6.1–8)
TRIGL SERPL-MCNC: 133 MG/DL (ref 0–199)

## 2023-10-25 ENCOUNTER — OFFICE VISIT (OUTPATIENT)
Dept: FAMILY MEDICINE CLINIC | Age: 48
End: 2023-10-25
Payer: COMMERCIAL

## 2023-10-25 ENCOUNTER — HOSPITAL ENCOUNTER (OUTPATIENT)
Age: 48
Discharge: HOME | End: 2023-10-25
Payer: COMMERCIAL

## 2023-10-25 VITALS
RESPIRATION RATE: 16 BRPM | TEMPERATURE: 97.6 F | SYSTOLIC BLOOD PRESSURE: 130 MMHG | WEIGHT: 198 LBS | OXYGEN SATURATION: 99 % | HEART RATE: 84 BPM | HEIGHT: 74 IN | BODY MASS INDEX: 25.41 KG/M2 | DIASTOLIC BLOOD PRESSURE: 88 MMHG

## 2023-10-25 DIAGNOSIS — Z00.00 ROUTINE PHYSICAL EXAMINATION: ICD-10-CM

## 2023-10-25 DIAGNOSIS — Z12.5 SCREENING FOR PROSTATE CANCER: ICD-10-CM

## 2023-10-25 DIAGNOSIS — Z00.00 ROUTINE PHYSICAL EXAMINATION: Primary | ICD-10-CM

## 2023-10-25 DIAGNOSIS — K21.01 GASTROESOPHAGEAL REFLUX DISEASE WITH ESOPHAGITIS AND HEMORRHAGE: ICD-10-CM

## 2023-10-25 PROBLEM — E55.9 VITAMIN D DEFICIENCY: Status: RESOLVED | Noted: 2018-10-04 | Resolved: 2023-10-25

## 2023-10-25 LAB
BASOPHILS ABSOLUTE: 0.2 THOU/MM3 (ref 0–0.1)
BASOPHILS NFR BLD AUTO: 2 %
DEPRECATED RDW RBC AUTO: 43.7 FL (ref 35–45)
EOSINOPHIL NFR BLD AUTO: 3.2 %
EOSINOPHILS ABSOLUTE: 0.3 THOU/MM3 (ref 0–0.4)
ERYTHROCYTE [DISTWIDTH] IN BLOOD BY AUTOMATED COUNT: 12.1 % (ref 11.5–14.5)
HCT VFR BLD AUTO: 46.3 % (ref 42–52)
HGB BLD-MCNC: 15.2 GM/DL (ref 14–18)
IMM GRANULOCYTES # BLD AUTO: 0.02 THOU/MM3 (ref 0–0.07)
IMM GRANULOCYTES NFR BLD AUTO: 0.2 %
LYMPHOCYTES ABSOLUTE: 2.7 THOU/MM3 (ref 1–4.8)
LYMPHOCYTES NFR BLD AUTO: 32.6 %
MCH RBC QN AUTO: 31.9 PG (ref 26–33)
MCHC RBC AUTO-ENTMCNC: 32.8 GM/DL (ref 32.2–35.5)
MCV RBC AUTO: 97.1 FL (ref 80–94)
MONOCYTES ABSOLUTE: 0.7 THOU/MM3 (ref 0.4–1.3)
MONOCYTES NFR BLD AUTO: 8.8 %
NEUTROPHILS NFR BLD AUTO: 53.2 %
NRBC BLD AUTO-RTO: 0 /100 WBC
PLATELET # BLD AUTO: 430 THOU/MM3 (ref 130–400)
PMV BLD AUTO: 10.4 FL (ref 9.4–12.4)
RBC # BLD AUTO: 4.77 MILL/MM3 (ref 4.7–6.1)
SEGMENTED NEUTROPHILS ABSOLUTE COUNT: 4.4 THOU/MM3 (ref 1.8–7.7)
WBC # BLD AUTO: 8.2 THOU/MM3 (ref 4.8–10.8)

## 2023-10-25 PROCEDURE — 85025 COMPLETE CBC W/AUTO DIFF WBC: CPT

## 2023-10-25 PROCEDURE — 80061 LIPID PANEL: CPT

## 2023-10-25 PROCEDURE — G0103 PSA SCREENING: HCPCS

## 2023-10-25 PROCEDURE — 36415 COLL VENOUS BLD VENIPUNCTURE: CPT

## 2023-10-25 PROCEDURE — 80053 COMPREHEN METABOLIC PANEL: CPT

## 2023-10-25 PROCEDURE — 99396 PREV VISIT EST AGE 40-64: CPT | Performed by: NURSE PRACTITIONER

## 2023-10-25 RX ORDER — OMEPRAZOLE 40 MG/1
40 CAPSULE, DELAYED RELEASE ORAL
Qty: 180 CAPSULE | Refills: 3 | Status: SHIPPED | OUTPATIENT
Start: 2023-10-25

## 2023-10-25 SDOH — ECONOMIC STABILITY: INCOME INSECURITY: HOW HARD IS IT FOR YOU TO PAY FOR THE VERY BASICS LIKE FOOD, HOUSING, MEDICAL CARE, AND HEATING?: NOT HARD AT ALL

## 2023-10-25 SDOH — ECONOMIC STABILITY: FOOD INSECURITY: WITHIN THE PAST 12 MONTHS, YOU WORRIED THAT YOUR FOOD WOULD RUN OUT BEFORE YOU GOT MONEY TO BUY MORE.: NEVER TRUE

## 2023-10-25 SDOH — ECONOMIC STABILITY: FOOD INSECURITY: WITHIN THE PAST 12 MONTHS, THE FOOD YOU BOUGHT JUST DIDN'T LAST AND YOU DIDN'T HAVE MONEY TO GET MORE.: NEVER TRUE

## 2023-10-25 SDOH — ECONOMIC STABILITY: HOUSING INSECURITY
IN THE LAST 12 MONTHS, WAS THERE A TIME WHEN YOU DID NOT HAVE A STEADY PLACE TO SLEEP OR SLEPT IN A SHELTER (INCLUDING NOW)?: NO

## 2023-10-25 ASSESSMENT — PATIENT HEALTH QUESTIONNAIRE - PHQ9
2. FEELING DOWN, DEPRESSED OR HOPELESS: NOT AT ALL
1. LITTLE INTEREST OR PLEASURE IN DOING THINGS: NOT AT ALL
SUM OF ALL RESPONSES TO PHQ QUESTIONS 1-9: 0
2. FEELING DOWN, DEPRESSED OR HOPELESS: 0
SUM OF ALL RESPONSES TO PHQ QUESTIONS 1-9: 0
SUM OF ALL RESPONSES TO PHQ9 QUESTIONS 1 & 2: 0
SUM OF ALL RESPONSES TO PHQ QUESTIONS 1-9: 0
SUM OF ALL RESPONSES TO PHQ QUESTIONS 1-9: 0
SUM OF ALL RESPONSES TO PHQ9 QUESTIONS 1 & 2: 0
1. LITTLE INTEREST OR PLEASURE IN DOING THINGS: 0

## 2023-10-25 NOTE — PROGRESS NOTES
schedules to be completed. reviewed appropriate vaccines. Pt refused none. Orders per enc. To call with any questions or concerns.

## 2023-10-26 LAB
ALBUMIN SERPL BCG-MCNC: 4.9 G/DL (ref 3.5–5.1)
ALP SERPL-CCNC: 81 U/L (ref 38–126)
ALT SERPL W/O P-5'-P-CCNC: 31 U/L (ref 11–66)
ANION GAP SERPL CALC-SCNC: 11 MEQ/L (ref 8–16)
AST SERPL-CCNC: 19 U/L (ref 5–40)
BILIRUB SERPL-MCNC: 1.1 MG/DL (ref 0.3–1.2)
BUN SERPL-MCNC: 9 MG/DL (ref 7–22)
CALCIUM SERPL-MCNC: 10 MG/DL (ref 8.5–10.5)
CHLORIDE SERPL-SCNC: 102 MEQ/L (ref 98–111)
CHOLEST SERPL-MCNC: 157 MG/DL (ref 100–199)
CO2 SERPL-SCNC: 26 MEQ/L (ref 23–33)
CREAT SERPL-MCNC: 0.9 MG/DL (ref 0.4–1.2)
GFR SERPL CREATININE-BSD FRML MDRD: > 60 ML/MIN/1.73M2
GLUCOSE SERPL-MCNC: 108 MG/DL (ref 70–108)
HDLC SERPL-MCNC: 35 MG/DL
LDLC SERPL CALC-MCNC: 86 MG/DL
POTASSIUM SERPL-SCNC: 4.6 MEQ/L (ref 3.5–5.2)
PROT SERPL-MCNC: 7.7 G/DL (ref 6.1–8)
PSA SERPL-MCNC: 1.44 NG/ML (ref 0–1)
SODIUM SERPL-SCNC: 139 MEQ/L (ref 135–145)
TRIGL SERPL-MCNC: 180 MG/DL (ref 0–199)

## 2024-09-18 ENCOUNTER — OFFICE VISIT (OUTPATIENT)
Dept: FAMILY MEDICINE CLINIC | Age: 49
End: 2024-09-18
Payer: COMMERCIAL

## 2024-09-18 VITALS
HEART RATE: 78 BPM | SYSTOLIC BLOOD PRESSURE: 120 MMHG | TEMPERATURE: 98 F | OXYGEN SATURATION: 98 % | BODY MASS INDEX: 25.51 KG/M2 | WEIGHT: 198.8 LBS | DIASTOLIC BLOOD PRESSURE: 80 MMHG | HEIGHT: 74 IN | RESPIRATION RATE: 17 BRPM

## 2024-09-18 DIAGNOSIS — Z12.5 SCREENING FOR PROSTATE CANCER: ICD-10-CM

## 2024-09-18 DIAGNOSIS — Z00.00 ROUTINE PHYSICAL EXAMINATION: Primary | ICD-10-CM

## 2024-09-18 PROCEDURE — 99396 PREV VISIT EST AGE 40-64: CPT | Performed by: NURSE PRACTITIONER

## 2024-09-18 ASSESSMENT — PATIENT HEALTH QUESTIONNAIRE - PHQ9
SUM OF ALL RESPONSES TO PHQ QUESTIONS 1-9: 0
SUM OF ALL RESPONSES TO PHQ9 QUESTIONS 1 & 2: 0
SUM OF ALL RESPONSES TO PHQ QUESTIONS 1-9: 0
2. FEELING DOWN, DEPRESSED OR HOPELESS: NOT AT ALL
1. LITTLE INTEREST OR PLEASURE IN DOING THINGS: NOT AT ALL
SUM OF ALL RESPONSES TO PHQ QUESTIONS 1-9: 0
SUM OF ALL RESPONSES TO PHQ QUESTIONS 1-9: 0

## 2025-03-13 ENCOUNTER — OFFICE VISIT (OUTPATIENT)
Dept: PRIMARY CARE CLINIC | Age: 50
End: 2025-03-13
Payer: COMMERCIAL

## 2025-03-13 VITALS
HEART RATE: 96 BPM | SYSTOLIC BLOOD PRESSURE: 134 MMHG | WEIGHT: 201 LBS | BODY MASS INDEX: 25.8 KG/M2 | DIASTOLIC BLOOD PRESSURE: 70 MMHG | TEMPERATURE: 98 F | OXYGEN SATURATION: 97 % | RESPIRATION RATE: 20 BRPM

## 2025-03-13 DIAGNOSIS — J01.40 ACUTE NON-RECURRENT PANSINUSITIS: Primary | ICD-10-CM

## 2025-03-13 PROCEDURE — 99203 OFFICE O/P NEW LOW 30 MIN: CPT

## 2025-03-13 RX ORDER — AMOXICILLIN 875 MG/1
875 TABLET, COATED ORAL 2 TIMES DAILY
Qty: 20 TABLET | Refills: 0 | Status: SHIPPED | OUTPATIENT
Start: 2025-03-13 | End: 2025-03-23

## 2025-03-13 RX ORDER — PREDNISONE 20 MG/1
40 TABLET ORAL DAILY
Qty: 10 TABLET | Refills: 0 | Status: SHIPPED | OUTPATIENT
Start: 2025-03-13 | End: 2025-03-18

## 2025-03-13 ASSESSMENT — ENCOUNTER SYMPTOMS
SINUS COMPLAINT: 1
SHORTNESS OF BREATH: 0
SORE THROAT: 0
SINUS PRESSURE: 1
COUGH: 1

## 2025-03-13 NOTE — PROGRESS NOTES
[facility] Walk [facility] department of [facility]  [address] [facility] [address]  Phone: [phone]  Fax: [phone]      Samy Albert  1975  MRN: [id number]  Date of visit: 3/13/2025    Chief Complaint:     Samy Albert is here for c/o of Sinus Problem (Pt thinks he has a sinus infection, green , yellow mucus, coughing mucus up, teeth hurt. Had it for a week. )      HPI:     Samy Albert is a 49 y.o. male who presents to the [facility] today for his medical conditions/complaints as noted below.    Sinus Problem  This is a new problem. Episode onset: 1 week. The problem has been gradually worsening since onset. There has been no fever. He is experiencing no pain. Associated symptoms include chills, congestion, coughing, headaches and sinus pressure. Pertinent negatives include no shortness of breath or sore throat. Treatments tried: tylenol, ibuprofen. The treatment provided no relief.       Past Medical History:   Diagnosis Date    Allergic rhinitis     Broken nose     Broken ribs     Dermatitis     scalp    Headache     History of blood clots     Right leg    Osteoarthritis     stomach ulcer         Allergies   Allergen Reactions    Neosporin [Neomycin-Polymyxin-Gramicidin] Rash         Subjective:      Review of Systems   Constitutional:  Positive for chills.   HENT:  Positive for congestion and sinus pressure. Negative for sore throat.    Respiratory:  Positive for cough. Negative for shortness of breath.    Neurological:  Positive for headaches.       Objective:     Vitals:    03/13/25 1905   BP: 134/70   Pulse: 96   Resp: 20   Temp: 98 °F (36.7 °C)   TempSrc: Tympanic   SpO2: 97%   Weight: 91.2 kg (201 lb)     Body mass index is 25.8 kg/m².    Physical Exam  Vitals and nursing note reviewed.   Constitutional:       Appearance: Normal appearance. He is not ill-appearing.   HENT:      Head: Normocephalic and atraumatic.      Right Ear:

## 2025-03-13 NOTE — PATIENT INSTRUCTIONS
Steroid x 5 days  Discussed taking medication as prescribed in AM with food  Avoid NSAIDs while taking prescription steroid  Complete full course of antibiotics  Continue with mucinex and nasal sprays  May use a nikita pot or saline rinses as tolerated  May use tylenol for headaches  Increase water intake  If symptoms worsen follow up with PCP  Patient verbalized understanding and agrees with plan of care

## 2025-05-16 ENCOUNTER — HOSPITAL ENCOUNTER (OUTPATIENT)
Dept: CT IMAGING | Age: 50
Discharge: HOME | End: 2025-05-16
Attending: FAMILY MEDICINE
Payer: COMMERCIAL

## 2025-05-16 DIAGNOSIS — J32.9 CHRONIC SINUSITIS, UNSPECIFIED LOCATION: ICD-10-CM

## 2025-05-16 PROCEDURE — 70486 CT MAXILLOFACIAL W/O DYE: CPT
